# Patient Record
Sex: FEMALE | Race: WHITE | NOT HISPANIC OR LATINO | Employment: UNEMPLOYED | URBAN - NONMETROPOLITAN AREA
[De-identification: names, ages, dates, MRNs, and addresses within clinical notes are randomized per-mention and may not be internally consistent; named-entity substitution may affect disease eponyms.]

---

## 2017-02-23 ENCOUNTER — APPOINTMENT (OUTPATIENT)
Dept: GENERAL RADIOLOGY | Facility: HOSPITAL | Age: 37
End: 2017-02-23

## 2017-02-23 ENCOUNTER — HOSPITAL ENCOUNTER (EMERGENCY)
Facility: HOSPITAL | Age: 37
Discharge: HOME | End: 2017-02-24
Admitting: FAMILY MEDICINE

## 2017-02-23 DIAGNOSIS — S43.004A SHOULDER DISLOCATION, RIGHT, INITIAL ENCOUNTER: Primary | ICD-10-CM

## 2017-02-23 PROCEDURE — 99284 EMERGENCY DEPT VISIT MOD MDM: CPT

## 2017-02-23 PROCEDURE — 73090 X-RAY EXAM OF FOREARM: CPT

## 2017-02-23 PROCEDURE — 73030 X-RAY EXAM OF SHOULDER: CPT

## 2017-02-23 RX ORDER — LIDOCAINE HYDROCHLORIDE 10 MG/ML
INJECTION, SOLUTION INFILTRATION; PERINEURAL
Status: COMPLETED
Start: 2017-02-23 | End: 2017-02-23

## 2017-02-23 RX ORDER — PROPOFOL 10 MG/ML
50 VIAL (ML) INTRAVENOUS ONCE
Status: DISCONTINUED | OUTPATIENT
Start: 2017-02-23 | End: 2017-02-23

## 2017-02-23 RX ORDER — LISINOPRIL 30 MG/1
30 TABLET ORAL DAILY
COMMUNITY
End: 2021-01-10 | Stop reason: HOSPADM

## 2017-02-23 RX ADMIN — LIDOCAINE HYDROCHLORIDE: 10 INJECTION, SOLUTION INFILTRATION; PERINEURAL at 23:58

## 2017-02-24 ENCOUNTER — APPOINTMENT (OUTPATIENT)
Dept: GENERAL RADIOLOGY | Facility: HOSPITAL | Age: 37
End: 2017-02-24

## 2017-02-24 VITALS
TEMPERATURE: 97.2 F | RESPIRATION RATE: 19 BRPM | SYSTOLIC BLOOD PRESSURE: 115 MMHG | HEIGHT: 66 IN | HEART RATE: 70 BPM | OXYGEN SATURATION: 100 % | DIASTOLIC BLOOD PRESSURE: 69 MMHG | BODY MASS INDEX: 35.52 KG/M2 | WEIGHT: 221 LBS

## 2017-02-24 PROCEDURE — 73030 X-RAY EXAM OF SHOULDER: CPT

## 2017-02-24 RX ORDER — OXYCODONE AND ACETAMINOPHEN 7.5; 325 MG/1; MG/1
1 TABLET ORAL EVERY 4 HOURS PRN
Qty: 12 TABLET | Refills: 0 | Status: SHIPPED | OUTPATIENT
Start: 2017-02-24 | End: 2019-12-15 | Stop reason: HOSPADM

## 2017-03-30 ENCOUNTER — HOSPITAL ENCOUNTER (EMERGENCY)
Facility: HOSPITAL | Age: 37
Discharge: HOME | End: 2017-03-30
Admitting: NURSE PRACTITIONER

## 2017-03-30 ENCOUNTER — APPOINTMENT (OUTPATIENT)
Dept: GENERAL RADIOLOGY | Facility: HOSPITAL | Age: 37
End: 2017-03-30

## 2017-03-30 VITALS
RESPIRATION RATE: 16 BRPM | OXYGEN SATURATION: 97 % | DIASTOLIC BLOOD PRESSURE: 67 MMHG | HEIGHT: 66 IN | SYSTOLIC BLOOD PRESSURE: 101 MMHG | TEMPERATURE: 97.7 F | BODY MASS INDEX: 33.75 KG/M2 | WEIGHT: 210 LBS | HEART RATE: 65 BPM

## 2017-03-30 DIAGNOSIS — M79.671 PAIN, FOOT, CHRONIC, RIGHT: Primary | ICD-10-CM

## 2017-03-30 DIAGNOSIS — G89.29 PAIN, FOOT, CHRONIC, RIGHT: Primary | ICD-10-CM

## 2017-03-30 PROCEDURE — 99282 EMERGENCY DEPT VISIT SF MDM: CPT

## 2017-03-30 PROCEDURE — 73630 X-RAY EXAM OF FOOT: CPT

## 2017-03-30 RX ORDER — KETOROLAC TROMETHAMINE 10 MG/1
10 TABLET, FILM COATED ORAL EVERY 6 HOURS PRN
Qty: 15 TABLET | Refills: 0 | Status: SHIPPED | OUTPATIENT
Start: 2017-03-30 | End: 2019-12-15 | Stop reason: HOSPADM

## 2017-04-03 NOTE — ED PROVIDER NOTES
Subjective      Patient is a 36-year-old female presents emergency Department with complaints of right foot pain.  She reports chronic foot pain.  She denies any known injury.  She has had this pain for many months now again without any known injury.  She also presents with right shoulder pain.  She was evaluated in this emergency department on 02/23/2017 for dislocated shoulder.  She states that she was instructed to follow-up with orthopedics however was unable to do so because of lack of a referral from her primary care physician/an insurance issues.  She denies any new injury.  But wanted to let this provider know that she continues to have right shoulder pain.  Due to symptoms she elected to come the emergency department for evaluation treatment.  Patient is a 36 y.o. female presenting with lower extremity pain.   Lower Extremity Issue   Location:  Foot  Injury: no    Foot location:  R foot  Pain details:     Quality:  Throbbing    Severity:  Mild    Onset quality:  Gradual    Timing:  Intermittent    Progression:  Worsening  Chronicity:  Chronic  Prior injury to area:  No  Relieved by:  Nothing  Worsened by:  Bearing weight  Ineffective treatments:  None tried  Associated symptoms: no back pain, no decreased ROM, no fatigue, no fever, no muscle weakness, no neck pain, no numbness, no stiffness, no swelling and no tingling    Risk factors: no concern for non-accidental trauma        Review of Systems   Constitutional: Negative for appetite change, chills, fatigue and fever.   HENT: Negative for congestion and sore throat.    Respiratory: Negative for chest tightness and shortness of breath.    Cardiovascular: Negative for palpitations.   Gastrointestinal: Negative for abdominal distention, abdominal pain and vomiting.   Genitourinary: Negative for difficulty urinating and dysuria.   Musculoskeletal: Negative for back pain, joint swelling, neck pain, neck stiffness and stiffness.   Skin: Negative for rash.    Neurological: Negative for dizziness and headaches.   All other systems reviewed and are negative.      Past Medical History:   Diagnosis Date   • Hypertension    • TMJ (temporomandibular joint syndrome)        No Known Allergies    Past Surgical History:   Procedure Laterality Date   • HYSTERECTOMY         History reviewed. No pertinent family history.    Social History     Social History   • Marital status:      Spouse name: N/A   • Number of children: N/A   • Years of education: N/A     Social History Main Topics   • Smoking status: Current Some Day Smoker     Packs/day: 0.50   • Smokeless tobacco: None   • Alcohol use No   • Drug use: No   • Sexual activity: Not Asked     Other Topics Concern   • None     Social History Narrative       Prior to Admission medications    Medication Sig Start Date End Date Taking? Authorizing Provider   lisinopril (PRINIVIL,ZESTRIL) 30 MG tablet Take 30 mg by mouth Daily.   Yes Historical Provider, MD   acetaminophen-codeine (TYLENOL #3) 300-30 MG per tablet Take 1 tablet by mouth Every 4 (Four) Hours As Needed for moderate pain (4-6). 11/24/16   Mark Silver MD   acetaminophen-codeine (TYLENOL #3) 300-30 MG per tablet Take 1 tablet by mouth Every 4 (Four) Hours As Needed for moderate pain (4-6). 12/22/16   Patience Vicente,    amoxicillin-clavulanate (AUGMENTIN) 875-125 MG per tablet Take 1 tablet by mouth Every 12 (Twelve) Hours. 11/20/16   Mark Silver MD   ketorolac (TORADOL) 10 MG tablet Take 1 tablet by mouth Every 6 (Six) Hours As Needed for moderate pain (4-6). 11/20/16   Mark Silver MD   ketorolac (TORADOL) 10 MG tablet Take 1 tablet by mouth Every 6 (Six) Hours As Needed for Moderate Pain (4-6). 3/30/17   SHIVAM Shelby   neomycin-bacitracin-polymyxin (NEOSPORIN) 5-400-5000 ointment Apply  topically 3 (Three) Times a Day. 12/22/16   Patience Vicente DO   oxyCODONE-acetaminophen (PERCOCET) 7.5-325 MG per tablet Take 1 tablet by mouth Every 4  "(Four) Hours As Needed for moderate pain (4-6). 2/24/17   SHIVAM Verdin       Medications - No data to display    /67  Pulse 65  Temp 97.7 °F (36.5 °C)  Resp 16  Ht 66\" (167.6 cm)  Wt 210 lb (95.3 kg)  SpO2 97%  BMI 33.89 kg/m2      Objective   Physical Exam   Constitutional: She is oriented to person, place, and time. She appears well-developed and well-nourished.   HENT:   Head: Normocephalic and atraumatic.   Right Ear: External ear normal.   Left Ear: External ear normal.   Nose: Nose normal.   Mouth/Throat: Oropharynx is clear and moist.   Eyes: Conjunctivae and EOM are normal. Pupils are equal, round, and reactive to light.   Neck: Normal range of motion. Neck supple.   Cardiovascular: Normal rate, regular rhythm, normal heart sounds and intact distal pulses.    Pulmonary/Chest: Effort normal and breath sounds normal.   Abdominal: Soft. Bowel sounds are normal.   Musculoskeletal: Normal range of motion. She exhibits tenderness. She exhibits no edema.   Soft tissue tenderness to the dorsum of the right foot without obvious deformity noted; cap refill within normal limits; neurovascular intact.   Neurological: She is alert and oriented to person, place, and time.   Skin: Skin is warm and dry.   Psychiatric: She has a normal mood and affect. Her behavior is normal. Judgment and thought content normal.   Nursing note and vitals reviewed.      Procedures         Lab Results (last 24 hours)     ** No results found for the last 24 hours. **          XR Foot 3+ View Right   Final Result   1. Unremarkable radiographs of the right foot.   This report was finalized on 03/30/2017 17:10 by Dr. David Chavez MD.            ED Course  ED Course    x-rays are unremarkable.  Explained to the patient that she would need to follow-up with orthopedics as previously instructed for her right shoulder pain.  At that time she can also address any concerning issues of her foot pain that is chronic in nature. "  We gave the patient the name of the provider on-call today with orthopedics.  Explained to both patient and her  they could also go to the orthopedic walking clinic for evaluation if need be.  They both express understanding.  Also discussed the importance of getting a primary care physician in the event that they do need this sort of referral.       MDM  Number of Diagnoses or Management Options  Pain, foot, chronic, right: new and requires workup     Amount and/or Complexity of Data Reviewed  Tests in the radiology section of CPT®: ordered and reviewed    Risk of Complications, Morbidity, and/or Mortality  Presenting problems: low  Diagnostic procedures: low  Management options: low    Patient Progress  Patient progress: improved      Final diagnoses:   Pain, foot, chronic, right          Catherine Fu, SHIVAM  04/02/17 1920

## 2017-06-14 ENCOUNTER — HOSPITAL ENCOUNTER (OUTPATIENT)
Dept: PREADMISSION TESTING | Age: 37
Discharge: HOME | End: 2017-06-14
Payer: MEDICAID

## 2017-06-14 VITALS — WEIGHT: 197 LBS | BODY MASS INDEX: 31.66 KG/M2 | HEIGHT: 66 IN

## 2017-06-14 LAB
ANION GAP SERPL CALCULATED.3IONS-SCNC: 15 MMOL/L (ref 7–19)
BASOPHILS ABSOLUTE: 0.1 K/UL (ref 0–0.2)
BASOPHILS RELATIVE PERCENT: 1.5 % (ref 0–1)
BUN BLDV-MCNC: 12 MG/DL (ref 6–20)
CALCIUM SERPL-MCNC: 9.4 MG/DL (ref 8.6–10)
CHLORIDE BLD-SCNC: 102 MMOL/L (ref 98–111)
CO2: 26 MMOL/L (ref 22–29)
CREAT SERPL-MCNC: 0.7 MG/DL (ref 0.5–0.9)
EOSINOPHILS ABSOLUTE: 0.1 K/UL (ref 0–0.6)
EOSINOPHILS RELATIVE PERCENT: 1.1 % (ref 0–5)
GFR NON-AFRICAN AMERICAN: >60
GLUCOSE BLD-MCNC: 94 MG/DL (ref 74–109)
HCT VFR BLD CALC: 42.5 % (ref 37–47)
HEMOGLOBIN: 13.5 G/DL (ref 12–16)
LYMPHOCYTES ABSOLUTE: 2.6 K/UL (ref 1.1–4.5)
LYMPHOCYTES RELATIVE PERCENT: 42.2 % (ref 20–40)
MCH RBC QN AUTO: 29.6 PG (ref 27–31)
MCHC RBC AUTO-ENTMCNC: 31.8 G/DL (ref 33–37)
MCV RBC AUTO: 93.2 FL (ref 81–99)
MONOCYTES ABSOLUTE: 0.5 K/UL (ref 0–0.9)
MONOCYTES RELATIVE PERCENT: 8 % (ref 0–10)
NEUTROPHILS ABSOLUTE: 2.9 K/UL (ref 1.5–7.5)
NEUTROPHILS RELATIVE PERCENT: 47 % (ref 50–65)
PDW BLD-RTO: 12.5 % (ref 11.5–14.5)
PLATELET # BLD: 344 K/UL (ref 130–400)
PMV BLD AUTO: 10.7 FL (ref 9.4–12.3)
POTASSIUM SERPL-SCNC: 4.5 MMOL/L (ref 3.5–5)
RBC # BLD: 4.56 M/UL (ref 4.2–5.4)
SODIUM BLD-SCNC: 143 MMOL/L (ref 136–145)
WBC # BLD: 6.2 K/UL (ref 4.8–10.8)

## 2017-06-14 PROCEDURE — 85025 COMPLETE CBC W/AUTO DIFF WBC: CPT

## 2017-06-14 PROCEDURE — 93005 ELECTROCARDIOGRAM TRACING: CPT

## 2017-06-14 PROCEDURE — 80048 BASIC METABOLIC PNL TOTAL CA: CPT

## 2017-06-14 RX ORDER — LISINOPRIL 30 MG/1
30 TABLET ORAL DAILY
COMMUNITY

## 2017-06-15 ENCOUNTER — HOSPITAL ENCOUNTER (EMERGENCY)
Facility: HOSPITAL | Age: 37
Discharge: HOME | End: 2017-06-15
Admitting: NURSE PRACTITIONER

## 2017-06-15 ENCOUNTER — APPOINTMENT (OUTPATIENT)
Dept: GENERAL RADIOLOGY | Facility: HOSPITAL | Age: 37
End: 2017-06-15

## 2017-06-15 VITALS
HEIGHT: 66 IN | WEIGHT: 210 LBS | TEMPERATURE: 98.2 F | HEART RATE: 75 BPM | OXYGEN SATURATION: 99 % | SYSTOLIC BLOOD PRESSURE: 121 MMHG | DIASTOLIC BLOOD PRESSURE: 81 MMHG | RESPIRATION RATE: 18 BRPM | BODY MASS INDEX: 33.75 KG/M2

## 2017-06-15 DIAGNOSIS — S90.32XA CONTUSION OF LEFT FOOT, INITIAL ENCOUNTER: Primary | ICD-10-CM

## 2017-06-15 LAB
EKG P AXIS: 26 DEGREES
EKG P-R INTERVAL: 136 MS
EKG Q-T INTERVAL: 374 MS
EKG QRS DURATION: 82 MS
EKG QTC CALCULATION (BAZETT): 391 MS
EKG T AXIS: 26 DEGREES

## 2017-06-15 PROCEDURE — 73630 X-RAY EXAM OF FOOT: CPT

## 2017-06-15 PROCEDURE — 99283 EMERGENCY DEPT VISIT LOW MDM: CPT

## 2017-06-15 RX ORDER — IBUPROFEN 600 MG/1
600 TABLET ORAL EVERY 8 HOURS PRN
Qty: 30 TABLET | Refills: 0 | Status: SHIPPED | OUTPATIENT
Start: 2017-06-15 | End: 2022-08-23

## 2017-06-15 RX ORDER — TRAMADOL HYDROCHLORIDE 50 MG/1
50 TABLET ORAL EVERY 6 HOURS PRN
Qty: 15 TABLET | Refills: 0 | Status: SHIPPED | OUTPATIENT
Start: 2017-06-15 | End: 2019-12-15 | Stop reason: HOSPADM

## 2017-06-15 NOTE — ED NOTES
C/o smacking left 5th toe against bed frame last pm around 200-2100. Note bruising to entire 5th toe.     Concha Hermosillo RN  06/15/17 9086

## 2017-06-15 NOTE — DISCHARGE INSTRUCTIONS
Return to ER if symptoms worsen   Elevate/ ice for 24 hours then moist heat compresses three times a day for 15-20 min intervals

## 2017-06-15 NOTE — ED PROVIDER NOTES
Subjective   HPI Comments: Patient is a 37-year-old white female presents with left foot injury.  She states she ran it into the edge of the bed last night at home.  She is having pain to the left fifth digit.    Patient is a 37 y.o. female presenting with lower extremity pain.   History provided by:  Patient   used: No    Lower Extremity Issue       Review of Systems   Constitutional: Negative.    HENT: Negative.    Eyes: Negative.    Respiratory: Negative.    Cardiovascular: Negative.    Gastrointestinal: Negative.    Endocrine: Negative.    Genitourinary: Negative.    Musculoskeletal:        She presents with left lateral foot pain.  She states she ran her foot into the side of bed last night at home.  She is having pain to the fifth toe.  She denies any other injury.   Skin: Negative.    Allergic/Immunologic: Negative.    Neurological: Negative.    Hematological: Negative.    Psychiatric/Behavioral: Negative.    All other systems reviewed and are negative.      Past Medical History:   Diagnosis Date   • Hypertension    • Shoulder dislocation    • TMJ (temporomandibular joint syndrome)        No Known Allergies    Past Surgical History:   Procedure Laterality Date   • HYSTERECTOMY         History reviewed. No pertinent family history.    Social History     Social History   • Marital status:      Spouse name: N/A   • Number of children: N/A   • Years of education: N/A     Social History Main Topics   • Smoking status: Current Some Day Smoker     Packs/day: 0.50   • Smokeless tobacco: None   • Alcohol use No   • Drug use: No   • Sexual activity: Not Asked     Other Topics Concern   • None     Social History Narrative           Prior to Admission medications    Medication Sig Start Date End Date Taking? Authorizing Provider   lisinopril (PRINIVIL,ZESTRIL) 30 MG tablet Take 30 mg by mouth Daily.   Yes Historical Provider, MD   acetaminophen-codeine (TYLENOL #3) 300-30 MG per tablet  "Take 1 tablet by mouth Every 4 (Four) Hours As Needed for moderate pain (4-6). 11/24/16   Mark Silver MD   acetaminophen-codeine (TYLENOL #3) 300-30 MG per tablet Take 1 tablet by mouth Every 4 (Four) Hours As Needed for moderate pain (4-6). 12/22/16   Patience Vicente DO   amoxicillin-clavulanate (AUGMENTIN) 875-125 MG per tablet Take 1 tablet by mouth Every 12 (Twelve) Hours. 11/20/16   Mark Silver MD   ketorolac (TORADOL) 10 MG tablet Take 1 tablet by mouth Every 6 (Six) Hours As Needed for moderate pain (4-6). 11/20/16   Mark Silver MD   ketorolac (TORADOL) 10 MG tablet Take 1 tablet by mouth Every 6 (Six) Hours As Needed for Moderate Pain (4-6). 3/30/17   SHIVAM Shelby   neomycin-bacitracin-polymyxin (NEOSPORIN) 5-400-5000 ointment Apply  topically 3 (Three) Times a Day. 12/22/16   Patience Vicente DO   oxyCODONE-acetaminophen (PERCOCET) 7.5-325 MG per tablet Take 1 tablet by mouth Every 4 (Four) Hours As Needed for moderate pain (4-6). 2/24/17   SHIVAM Verdin       /81 (BP Location: Left arm, Patient Position: Sitting)  Pulse 75  Temp 98.2 °F (36.8 °C) (Temporal Artery )   Resp 18  Ht 66\" (167.6 cm)  Wt 210 lb (95.3 kg)  SpO2 99%  BMI 33.89 kg/m2    Objective   Physical Exam   Constitutional: She is oriented to person, place, and time. She appears well-developed and well-nourished.   HENT:   Head: Normocephalic and atraumatic.   Eyes: Conjunctivae and EOM are normal. Pupils are equal, round, and reactive to light.   Neck: Normal range of motion. Neck supple. No tracheal deviation present. No thyromegaly present.   Cardiovascular: Normal rate, regular rhythm, normal heart sounds and intact distal pulses.    Pulmonary/Chest: Effort normal and breath sounds normal. No respiratory distress. She has no wheezes. She has no rales. She exhibits no tenderness.   Abdominal: Soft. Bowel sounds are normal.   Musculoskeletal: Normal range of motion.   Moderate ecchymosis noted " to left 5th toe. Cap refill <2seconds. Mild soft tissue swelling noted. Pedal pulses palp   Neurological: She is alert and oriented to person, place, and time. She has normal reflexes. No cranial nerve deficit.   Skin: Skin is warm and dry.   Psychiatric: She has a normal mood and affect. Her behavior is normal. Judgment and thought content normal.   Nursing note and vitals reviewed.      Procedures         Lab Results (last 24 hours)     ** No results found for the last 24 hours. **          XR Foot 3+ View Left   Final Result   No acute osseous findings. Soft tissue swelling at the   lateral foot and anterior ankle.   This report was finalized on 06/15/2017 15:16 by Dr. Boaz Ledbetter MD.          ED Course  ED Course   Comment By Time   Ayden completed #[id number]. No signs of suspicious activity noted. Will write for small amt of pain medication for acute pain. Reviewed side effects and potential for abuse Jennifer Mirza, SHIVAM 06/15 1520          MDM  Number of Diagnoses or Management Options  Contusion of left foot, initial encounter: minor     Amount and/or Complexity of Data Reviewed  Tests in the radiology section of CPT®: ordered and reviewed    Patient Progress  Patient progress: stable      Final diagnoses:   Contusion of left foot, initial encounter          SHIVAM Verdin  06/15/17 2480

## 2017-06-25 PROBLEM — M25.311 INSTABILITY OF RIGHT SHOULDER JOINT: Status: ACTIVE | Noted: 2017-06-25

## 2017-06-26 ENCOUNTER — ANESTHESIA (OUTPATIENT)
Dept: OPERATING ROOM | Age: 37
End: 2017-06-26
Payer: MEDICAID

## 2017-06-26 ENCOUNTER — HOSPITAL ENCOUNTER (OUTPATIENT)
Age: 37
Setting detail: OUTPATIENT SURGERY
Discharge: HOME | End: 2017-06-26
Attending: ORTHOPAEDIC SURGERY | Admitting: ORTHOPAEDIC SURGERY
Payer: MEDICAID

## 2017-06-26 ENCOUNTER — ANESTHESIA EVENT (OUTPATIENT)
Dept: OPERATING ROOM | Age: 37
End: 2017-06-26
Payer: MEDICAID

## 2017-06-26 VITALS
TEMPERATURE: 97.9 F | HEART RATE: 64 BPM | SYSTOLIC BLOOD PRESSURE: 121 MMHG | DIASTOLIC BLOOD PRESSURE: 80 MMHG | WEIGHT: 200 LBS | BODY MASS INDEX: 32.14 KG/M2 | HEIGHT: 66 IN | RESPIRATION RATE: 18 BRPM | OXYGEN SATURATION: 96 %

## 2017-06-26 VITALS
DIASTOLIC BLOOD PRESSURE: 64 MMHG | OXYGEN SATURATION: 97 % | RESPIRATION RATE: 28 BRPM | TEMPERATURE: 97.3 F | SYSTOLIC BLOOD PRESSURE: 96 MMHG

## 2017-06-26 PROCEDURE — 2720000000 HC MISC SURG SUPPLY STERILE $0-50: Performed by: ORTHOPAEDIC SURGERY

## 2017-06-26 PROCEDURE — 2720000001 HC MISC SURG SUPPLY STERILE $51-500: Performed by: ORTHOPAEDIC SURGERY

## 2017-06-26 PROCEDURE — 2500000003 HC RX 250 WO HCPCS: Performed by: ORTHOPAEDIC SURGERY

## 2017-06-26 PROCEDURE — 6360000002 HC RX W HCPCS: Performed by: ANESTHESIOLOGY

## 2017-06-26 PROCEDURE — 6360000002 HC RX W HCPCS: Performed by: NURSE ANESTHETIST, CERTIFIED REGISTERED

## 2017-06-26 PROCEDURE — 7100000001 HC PACU RECOVERY - ADDTL 15 MIN: Performed by: ORTHOPAEDIC SURGERY

## 2017-06-26 PROCEDURE — 3600000004 HC SURGERY LEVEL 4 BASE: Performed by: ORTHOPAEDIC SURGERY

## 2017-06-26 PROCEDURE — 76942 ECHO GUIDE FOR BIOPSY: CPT | Performed by: NURSE ANESTHETIST, CERTIFIED REGISTERED

## 2017-06-26 PROCEDURE — 2500000003 HC RX 250 WO HCPCS: Performed by: NURSE ANESTHETIST, CERTIFIED REGISTERED

## 2017-06-26 PROCEDURE — 3700000000 HC ANESTHESIA ATTENDED CARE: Performed by: ORTHOPAEDIC SURGERY

## 2017-06-26 PROCEDURE — 7100000011 HC PHASE II RECOVERY - ADDTL 15 MIN: Performed by: ORTHOPAEDIC SURGERY

## 2017-06-26 PROCEDURE — 2580000003 HC RX 258: Performed by: ORTHOPAEDIC SURGERY

## 2017-06-26 PROCEDURE — 6360000002 HC RX W HCPCS: Performed by: ORTHOPAEDIC SURGERY

## 2017-06-26 PROCEDURE — 7100000000 HC PACU RECOVERY - FIRST 15 MIN: Performed by: ORTHOPAEDIC SURGERY

## 2017-06-26 PROCEDURE — 3700000001 HC ADD 15 MINUTES (ANESTHESIA): Performed by: ORTHOPAEDIC SURGERY

## 2017-06-26 PROCEDURE — 7100000010 HC PHASE II RECOVERY - FIRST 15 MIN: Performed by: ORTHOPAEDIC SURGERY

## 2017-06-26 PROCEDURE — 2720000003 HC MISC SUTURE/STAPLES/RELOADS/ETC: Performed by: ORTHOPAEDIC SURGERY

## 2017-06-26 PROCEDURE — 3600000014 HC SURGERY LEVEL 4 ADDTL 15MIN: Performed by: ORTHOPAEDIC SURGERY

## 2017-06-26 DEVICE — ANCHOR SUT DIA14MM 1 SFT SGL LD MB JUGGERKNOT: Type: IMPLANTABLE DEVICE | Site: SHOULDER | Status: FUNCTIONAL

## 2017-06-26 RX ORDER — MORPHINE SULFATE 4 MG/ML
4 INJECTION, SOLUTION INTRAMUSCULAR; INTRAVENOUS EVERY 5 MIN PRN
Status: DISCONTINUED | OUTPATIENT
Start: 2017-06-26 | End: 2017-06-26 | Stop reason: HOSPADM

## 2017-06-26 RX ORDER — MIDAZOLAM HYDROCHLORIDE 1 MG/ML
2 INJECTION INTRAMUSCULAR; INTRAVENOUS ONCE
Status: COMPLETED | OUTPATIENT
Start: 2017-06-26 | End: 2017-06-26

## 2017-06-26 RX ORDER — ONDANSETRON 2 MG/ML
INJECTION INTRAMUSCULAR; INTRAVENOUS PRN
Status: DISCONTINUED | OUTPATIENT
Start: 2017-06-26 | End: 2017-06-26 | Stop reason: SDUPTHER

## 2017-06-26 RX ORDER — ONDANSETRON 4 MG/1
4 TABLET, FILM COATED ORAL DAILY PRN
Qty: 20 TABLET | Refills: 0 | Status: SHIPPED | OUTPATIENT
Start: 2017-06-26

## 2017-06-26 RX ORDER — ENALAPRILAT 2.5 MG/2ML
1.25 INJECTION INTRAVENOUS
Status: DISCONTINUED | OUTPATIENT
Start: 2017-06-26 | End: 2017-06-26 | Stop reason: HOSPADM

## 2017-06-26 RX ORDER — MORPHINE SULFATE 4 MG/ML
2 INJECTION, SOLUTION INTRAMUSCULAR; INTRAVENOUS EVERY 5 MIN PRN
Status: DISCONTINUED | OUTPATIENT
Start: 2017-06-26 | End: 2017-06-26 | Stop reason: HOSPADM

## 2017-06-26 RX ORDER — HYDROCODONE BITARTRATE AND ACETAMINOPHEN 10; 325 MG/1; MG/1
TABLET ORAL
Qty: 40 TABLET | Refills: 0 | Status: SHIPPED | OUTPATIENT
Start: 2017-06-26

## 2017-06-26 RX ORDER — LABETALOL HYDROCHLORIDE 5 MG/ML
5 INJECTION, SOLUTION INTRAVENOUS EVERY 10 MIN PRN
Status: DISCONTINUED | OUTPATIENT
Start: 2017-06-26 | End: 2017-06-26 | Stop reason: HOSPADM

## 2017-06-26 RX ORDER — DIPHENHYDRAMINE HYDROCHLORIDE 50 MG/ML
12.5 INJECTION INTRAMUSCULAR; INTRAVENOUS
Status: DISCONTINUED | OUTPATIENT
Start: 2017-06-26 | End: 2017-06-26 | Stop reason: HOSPADM

## 2017-06-26 RX ORDER — HYDRALAZINE HYDROCHLORIDE 20 MG/ML
5 INJECTION INTRAMUSCULAR; INTRAVENOUS EVERY 10 MIN PRN
Status: DISCONTINUED | OUTPATIENT
Start: 2017-06-26 | End: 2017-06-26 | Stop reason: HOSPADM

## 2017-06-26 RX ORDER — TRAMADOL HYDROCHLORIDE 50 MG/1
50 TABLET ORAL EVERY 6 HOURS PRN
COMMUNITY

## 2017-06-26 RX ORDER — METOCLOPRAMIDE HYDROCHLORIDE 5 MG/ML
10 INJECTION INTRAMUSCULAR; INTRAVENOUS
Status: DISCONTINUED | OUTPATIENT
Start: 2017-06-26 | End: 2017-06-26 | Stop reason: HOSPADM

## 2017-06-26 RX ORDER — PROMETHAZINE HYDROCHLORIDE 25 MG/ML
6.25 INJECTION, SOLUTION INTRAMUSCULAR; INTRAVENOUS
Status: DISCONTINUED | OUTPATIENT
Start: 2017-06-26 | End: 2017-06-26 | Stop reason: HOSPADM

## 2017-06-26 RX ORDER — IBUPROFEN 600 MG/1
600 TABLET ORAL EVERY 8 HOURS PRN
COMMUNITY

## 2017-06-26 RX ORDER — DEXAMETHASONE SODIUM PHOSPHATE 10 MG/ML
INJECTION INTRAMUSCULAR; INTRAVENOUS PRN
Status: DISCONTINUED | OUTPATIENT
Start: 2017-06-26 | End: 2017-06-26 | Stop reason: SDUPTHER

## 2017-06-26 RX ORDER — SODIUM CHLORIDE, SODIUM LACTATE, POTASSIUM CHLORIDE, CALCIUM CHLORIDE 600; 310; 30; 20 MG/100ML; MG/100ML; MG/100ML; MG/100ML
INJECTION, SOLUTION INTRAVENOUS CONTINUOUS
Status: DISCONTINUED | OUTPATIENT
Start: 2017-06-26 | End: 2017-06-26 | Stop reason: HOSPADM

## 2017-06-26 RX ORDER — LIDOCAINE HYDROCHLORIDE 10 MG/ML
1 INJECTION, SOLUTION EPIDURAL; INFILTRATION; INTRACAUDAL; PERINEURAL ONCE
Status: COMPLETED | OUTPATIENT
Start: 2017-06-26 | End: 2017-06-26

## 2017-06-26 RX ORDER — TIZANIDINE 4 MG/1
4 TABLET ORAL EVERY 8 HOURS PRN
Qty: 40 TABLET | Refills: 0 | Status: SHIPPED | OUTPATIENT
Start: 2017-06-26

## 2017-06-26 RX ORDER — FENTANYL CITRATE 50 UG/ML
INJECTION, SOLUTION INTRAMUSCULAR; INTRAVENOUS PRN
Status: DISCONTINUED | OUTPATIENT
Start: 2017-06-26 | End: 2017-06-26 | Stop reason: SDUPTHER

## 2017-06-26 RX ORDER — LIDOCAINE HYDROCHLORIDE 10 MG/ML
INJECTION, SOLUTION EPIDURAL; INFILTRATION; INTRACAUDAL; PERINEURAL PRN
Status: DISCONTINUED | OUTPATIENT
Start: 2017-06-26 | End: 2017-06-26 | Stop reason: SDUPTHER

## 2017-06-26 RX ORDER — ROPIVACAINE HYDROCHLORIDE 5 MG/ML
INJECTION, SOLUTION EPIDURAL; INFILTRATION; PERINEURAL PRN
Status: DISCONTINUED | OUTPATIENT
Start: 2017-06-26 | End: 2017-06-26 | Stop reason: SDUPTHER

## 2017-06-26 RX ORDER — PROPOFOL 10 MG/ML
INJECTION, EMULSION INTRAVENOUS PRN
Status: DISCONTINUED | OUTPATIENT
Start: 2017-06-26 | End: 2017-06-26 | Stop reason: SDUPTHER

## 2017-06-26 RX ORDER — MEPERIDINE HYDROCHLORIDE 50 MG/ML
12.5 INJECTION INTRAMUSCULAR; INTRAVENOUS; SUBCUTANEOUS EVERY 5 MIN PRN
Status: DISCONTINUED | OUTPATIENT
Start: 2017-06-26 | End: 2017-06-26 | Stop reason: HOSPADM

## 2017-06-26 RX ORDER — ROCURONIUM BROMIDE 10 MG/ML
INJECTION, SOLUTION INTRAVENOUS PRN
Status: DISCONTINUED | OUTPATIENT
Start: 2017-06-26 | End: 2017-06-26 | Stop reason: SDUPTHER

## 2017-06-26 RX ADMIN — FENTANYL CITRATE 100 MCG: 50 INJECTION INTRAMUSCULAR; INTRAVENOUS at 09:38

## 2017-06-26 RX ADMIN — SUGAMMADEX 200 MG: 100 INJECTION, SOLUTION INTRAVENOUS at 10:57

## 2017-06-26 RX ADMIN — ONDANSETRON HYDROCHLORIDE 4 MG: 2 INJECTION, SOLUTION INTRAVENOUS at 09:54

## 2017-06-26 RX ADMIN — SODIUM CHLORIDE, POTASSIUM CHLORIDE, SODIUM LACTATE AND CALCIUM CHLORIDE: 600; 310; 30; 20 INJECTION, SOLUTION INTRAVENOUS at 07:14

## 2017-06-26 RX ADMIN — ROCURONIUM BROMIDE 50 MG: 10 INJECTION INTRAVENOUS at 09:39

## 2017-06-26 RX ADMIN — Medication 2 G: at 09:54

## 2017-06-26 RX ADMIN — LIDOCAINE HYDROCHLORIDE 5 ML: 10 INJECTION, SOLUTION EPIDURAL; INFILTRATION; INTRACAUDAL; PERINEURAL at 09:39

## 2017-06-26 RX ADMIN — LIDOCAINE HYDROCHLORIDE 1 ML: 10 INJECTION, SOLUTION EPIDURAL; INFILTRATION; INTRACAUDAL; PERINEURAL at 07:14

## 2017-06-26 RX ADMIN — ROPIVACAINE HYDROCHLORIDE 20 ML: 5 INJECTION, SOLUTION EPIDURAL; INFILTRATION; PERINEURAL at 08:25

## 2017-06-26 RX ADMIN — DEXAMETHASONE SODIUM PHOSPHATE 10 MG: 10 INJECTION INTRAMUSCULAR; INTRAVENOUS at 09:54

## 2017-06-26 RX ADMIN — PROPOFOL 200 MG: 10 INJECTION, EMULSION INTRAVENOUS at 09:39

## 2017-06-26 RX ADMIN — MIDAZOLAM 2 MG: 1 INJECTION INTRAMUSCULAR; INTRAVENOUS at 08:24

## 2017-06-26 ASSESSMENT — PAIN DESCRIPTION - ORIENTATION
ORIENTATION: RIGHT
ORIENTATION: RIGHT

## 2017-06-26 ASSESSMENT — PAIN DESCRIPTION - LOCATION
LOCATION: SHOULDER
LOCATION: SHOULDER

## 2017-06-26 ASSESSMENT — PAIN SCALES - GENERAL
PAINLEVEL_OUTOF10: 0
PAINLEVEL_OUTOF10: 3
PAINLEVEL_OUTOF10: 5

## 2017-06-26 ASSESSMENT — PAIN DESCRIPTION - PAIN TYPE
TYPE: SURGICAL PAIN
TYPE: SURGICAL PAIN

## 2017-06-26 ASSESSMENT — PAIN - FUNCTIONAL ASSESSMENT: PAIN_FUNCTIONAL_ASSESSMENT: 0-10

## 2018-05-07 ENCOUNTER — HOSPITAL ENCOUNTER (EMERGENCY)
Facility: HOSPITAL | Age: 38
Discharge: HOME | End: 2018-05-07
Attending: EMERGENCY MEDICINE | Admitting: EMERGENCY MEDICINE

## 2018-05-07 ENCOUNTER — APPOINTMENT (OUTPATIENT)
Dept: CT IMAGING | Facility: HOSPITAL | Age: 38
End: 2018-05-07

## 2018-05-07 VITALS
DIASTOLIC BLOOD PRESSURE: 90 MMHG | HEIGHT: 67 IN | RESPIRATION RATE: 16 BRPM | SYSTOLIC BLOOD PRESSURE: 153 MMHG | WEIGHT: 213.6 LBS | TEMPERATURE: 98.9 F | HEART RATE: 64 BPM | BODY MASS INDEX: 33.53 KG/M2 | OXYGEN SATURATION: 96 %

## 2018-05-07 DIAGNOSIS — B96.89 BACTERIAL VAGINOSIS: ICD-10-CM

## 2018-05-07 DIAGNOSIS — K52.9 COLITIS: Primary | ICD-10-CM

## 2018-05-07 DIAGNOSIS — N76.0 BACTERIAL VAGINOSIS: ICD-10-CM

## 2018-05-07 LAB
ALBUMIN SERPL-MCNC: 4.6 G/DL (ref 3.5–5)
ALBUMIN/GLOB SERPL: 1.4 G/DL (ref 1.1–2.5)
ALP SERPL-CCNC: 113 U/L (ref 24–120)
ALT SERPL W P-5'-P-CCNC: 24 U/L (ref 0–54)
ANION GAP SERPL CALCULATED.3IONS-SCNC: 12 MMOL/L (ref 4–13)
AST SERPL-CCNC: 31 U/L (ref 7–45)
B-HCG UR QL: NEGATIVE
BACTERIA UR QL AUTO: ABNORMAL /HPF
BASOPHILS # BLD AUTO: 0.08 10*3/MM3 (ref 0–0.2)
BASOPHILS NFR BLD AUTO: 0.8 % (ref 0–2)
BILIRUB SERPL-MCNC: 0.2 MG/DL (ref 0.1–1)
BILIRUB UR QL STRIP: NEGATIVE
BUN BLD-MCNC: 13 MG/DL (ref 5–21)
BUN/CREAT SERPL: 14.8 (ref 7–25)
CALCIUM SPEC-SCNC: 9.9 MG/DL (ref 8.4–10.4)
CHLORIDE SERPL-SCNC: 105 MMOL/L (ref 98–110)
CLARITY UR: CLEAR
CLUE CELLS SPEC QL WET PREP: ABNORMAL
CO2 SERPL-SCNC: 29 MMOL/L (ref 24–31)
COLOR UR: YELLOW
CREAT BLD-MCNC: 0.88 MG/DL (ref 0.5–1.4)
DEPRECATED RDW RBC AUTO: 39.4 FL (ref 40–54)
EOSINOPHIL # BLD AUTO: 0.03 10*3/MM3 (ref 0–0.7)
EOSINOPHIL NFR BLD AUTO: 0.3 % (ref 0–4)
ERYTHROCYTE [DISTWIDTH] IN BLOOD BY AUTOMATED COUNT: 12.1 % (ref 12–15)
GFR SERPL CREATININE-BSD FRML MDRD: 72 ML/MIN/1.73
GLOBULIN UR ELPH-MCNC: 3.4 GM/DL
GLUCOSE BLD-MCNC: 121 MG/DL (ref 70–100)
GLUCOSE UR STRIP-MCNC: NEGATIVE MG/DL
HCT VFR BLD AUTO: 42 % (ref 37–47)
HGB BLD-MCNC: 13.7 G/DL (ref 12–16)
HGB UR QL STRIP.AUTO: ABNORMAL
HOLD SPECIMEN: NORMAL
HOLD SPECIMEN: NORMAL
HYALINE CASTS UR QL AUTO: ABNORMAL /LPF
HYDATID CYST SPEC WET PREP: ABNORMAL
IMM GRANULOCYTES # BLD: 0.05 10*3/MM3 (ref 0–0.03)
IMM GRANULOCYTES NFR BLD: 0.5 % (ref 0–5)
KETONES UR QL STRIP: NEGATIVE
LEUKOCYTE ESTERASE UR QL STRIP.AUTO: NEGATIVE
LYMPHOCYTES # BLD AUTO: 2.67 10*3/MM3 (ref 0.72–4.86)
LYMPHOCYTES NFR BLD AUTO: 25.6 % (ref 15–45)
MCH RBC QN AUTO: 29.1 PG (ref 28–32)
MCHC RBC AUTO-ENTMCNC: 32.6 G/DL (ref 33–36)
MCV RBC AUTO: 89.2 FL (ref 82–98)
MONOCYTES # BLD AUTO: 0.72 10*3/MM3 (ref 0.19–1.3)
MONOCYTES NFR BLD AUTO: 6.9 % (ref 4–12)
NEUTROPHILS # BLD AUTO: 6.9 10*3/MM3 (ref 1.87–8.4)
NEUTROPHILS NFR BLD AUTO: 65.9 % (ref 39–78)
NITRITE UR QL STRIP: NEGATIVE
NRBC BLD MANUAL-RTO: 0 /100 WBC (ref 0–0)
PH UR STRIP.AUTO: <=5 [PH] (ref 5–8)
PLATELET # BLD AUTO: 353 10*3/MM3 (ref 130–400)
PMV BLD AUTO: 10.9 FL (ref 6–12)
POTASSIUM BLD-SCNC: 4.2 MMOL/L (ref 3.5–5.3)
PROT SERPL-MCNC: 8 G/DL (ref 6.3–8.7)
PROT UR QL STRIP: NEGATIVE
RBC # BLD AUTO: 4.71 10*6/MM3 (ref 4.2–5.4)
RBC # UR: ABNORMAL /HPF
REF LAB TEST METHOD: ABNORMAL
SODIUM BLD-SCNC: 146 MMOL/L (ref 135–145)
SP GR UR STRIP: 1.03 (ref 1–1.03)
SQUAMOUS #/AREA URNS HPF: ABNORMAL /HPF
T VAGINALIS SPEC QL WET PREP: ABNORMAL
UROBILINOGEN UR QL STRIP: ABNORMAL
WBC NRBC COR # BLD: 10.45 10*3/MM3 (ref 4.8–10.8)
WBC SPEC QL WET PREP: ABNORMAL
WBC UR QL AUTO: ABNORMAL /HPF
WHOLE BLOOD HOLD SPECIMEN: NORMAL
WHOLE BLOOD HOLD SPECIMEN: NORMAL
YEAST GENITAL QL WET PREP: ABNORMAL

## 2018-05-07 PROCEDURE — 96375 TX/PRO/DX INJ NEW DRUG ADDON: CPT

## 2018-05-07 PROCEDURE — 74176 CT ABD & PELVIS W/O CONTRAST: CPT

## 2018-05-07 PROCEDURE — 25010000002 KETOROLAC TROMETHAMINE PER 15 MG: Performed by: EMERGENCY MEDICINE

## 2018-05-07 PROCEDURE — 99284 EMERGENCY DEPT VISIT MOD MDM: CPT

## 2018-05-07 PROCEDURE — 87591 N.GONORRHOEAE DNA AMP PROB: CPT | Performed by: EMERGENCY MEDICINE

## 2018-05-07 PROCEDURE — 81025 URINE PREGNANCY TEST: CPT | Performed by: EMERGENCY MEDICINE

## 2018-05-07 PROCEDURE — 96374 THER/PROPH/DIAG INJ IV PUSH: CPT

## 2018-05-07 PROCEDURE — 81001 URINALYSIS AUTO W/SCOPE: CPT | Performed by: EMERGENCY MEDICINE

## 2018-05-07 PROCEDURE — 25010000002 CEFTRIAXONE PER 250 MG: Performed by: EMERGENCY MEDICINE

## 2018-05-07 PROCEDURE — 85025 COMPLETE CBC W/AUTO DIFF WBC: CPT | Performed by: EMERGENCY MEDICINE

## 2018-05-07 PROCEDURE — 87210 SMEAR WET MOUNT SALINE/INK: CPT | Performed by: EMERGENCY MEDICINE

## 2018-05-07 PROCEDURE — 87491 CHLMYD TRACH DNA AMP PROBE: CPT | Performed by: EMERGENCY MEDICINE

## 2018-05-07 PROCEDURE — 80053 COMPREHEN METABOLIC PANEL: CPT | Performed by: EMERGENCY MEDICINE

## 2018-05-07 RX ORDER — CIPROFLOXACIN 500 MG/1
500 TABLET, FILM COATED ORAL 2 TIMES DAILY
Qty: 14 TABLET | Refills: 0 | Status: SHIPPED | OUTPATIENT
Start: 2018-05-07 | End: 2019-12-15 | Stop reason: HOSPADM

## 2018-05-07 RX ORDER — DICYCLOMINE HCL 20 MG
20 TABLET ORAL EVERY 6 HOURS PRN
Qty: 20 TABLET | Refills: 0 | Status: SHIPPED | OUTPATIENT
Start: 2018-05-07

## 2018-05-07 RX ORDER — KETOROLAC TROMETHAMINE 30 MG/ML
30 INJECTION, SOLUTION INTRAMUSCULAR; INTRAVENOUS ONCE
Status: COMPLETED | OUTPATIENT
Start: 2018-05-07 | End: 2018-05-07

## 2018-05-07 RX ORDER — KETOROLAC TROMETHAMINE 30 MG/ML
60 INJECTION, SOLUTION INTRAMUSCULAR; INTRAVENOUS ONCE
Status: DISCONTINUED | OUTPATIENT
Start: 2018-05-07 | End: 2018-05-07

## 2018-05-07 RX ORDER — METRONIDAZOLE 500 MG/1
500 TABLET ORAL 2 TIMES DAILY
Qty: 14 TABLET | Refills: 0 | Status: SHIPPED | OUTPATIENT
Start: 2018-05-07 | End: 2019-12-15 | Stop reason: HOSPADM

## 2018-05-07 RX ORDER — NITROFURANTOIN 25; 75 MG/1; MG/1
100 CAPSULE ORAL ONCE
Status: DISCONTINUED | OUTPATIENT
Start: 2018-05-07 | End: 2018-05-07

## 2018-05-07 RX ADMIN — CEFTRIAXONE SODIUM 1 G: 1 INJECTION, POWDER, FOR SOLUTION INTRAMUSCULAR; INTRAVENOUS at 02:08

## 2018-05-07 RX ADMIN — KETOROLAC TROMETHAMINE 30 MG: 30 INJECTION, SOLUTION INTRAMUSCULAR at 02:08

## 2018-05-07 NOTE — DISCHARGE INSTRUCTIONS
Bacterial Vaginosis  Bacterial vaginosis is a vaginal infection that occurs when the normal balance of bacteria in the vagina is disrupted. It results from an overgrowth of certain bacteria. This is the most common vaginal infection among women ages 15-44.  Because bacterial vaginosis increases your risk for STIs (sexually transmitted infections), getting treated can help reduce your risk for chlamydia, gonorrhea, herpes, and HIV (human immunodeficiency virus). Treatment is also important for preventing complications in pregnant women, because this condition can cause an early (premature) delivery.  What are the causes?  This condition is caused by an increase in harmful bacteria that are normally present in small amounts in the vagina. However, the reason that the condition develops is not fully understood.  What increases the risk?  The following factors may make you more likely to develop this condition:  · Having a new sexual partner or multiple sexual partners.  · Having unprotected sex.  · Douching.  · Having an intrauterine device (IUD).  · Smoking.  · Drug and alcohol abuse.  · Taking certain antibiotic medicines.  · Being pregnant.  You cannot get bacterial vaginosis from toilet seats, bedding, swimming pools, or contact with objects around you.  What are the signs or symptoms?  Symptoms of this condition include:  · Grey or white vaginal discharge. The discharge can also be watery or foamy.  · A fish-like odor with discharge, especially after sexual intercourse or during menstruation.  · Itching in and around the vagina.  · Burning or pain with urination.  Some women with bacterial vaginosis have no signs or symptoms.  How is this diagnosed?  This condition is diagnosed based on:  · Your medical history.  · A physical exam of the vagina.  · Testing a sample of vaginal fluid under a microscope to look for a large amount of bad bacteria or abnormal cells. Your health care provider may use a cotton swab or a  small wooden spatula to collect the sample.  How is this treated?  This condition is treated with antibiotics. These may be given as a pill, a vaginal cream, or a medicine that is put into the vagina (suppository). If the condition comes back after treatment, a second round of antibiotics may be needed.  Follow these instructions at home:  Medicines   · Take over-the-counter and prescription medicines only as told by your health care provider.  · Take or use your antibiotic as told by your health care provider. Do not stop taking or using the antibiotic even if you start to feel better.  General instructions   · If you have a female sexual partner, tell her that you have a vaginal infection. She should see her health care provider and be treated if she has symptoms. If you have a male sexual partner, he does not need treatment.  · During treatment:  ¨ Avoid sexual activity until you finish treatment.  ¨ Do not douche.  ¨ Avoid alcohol as directed by your health care provider.  ¨ Avoid breastfeeding as directed by your health care provider.  · Drink enough water and fluids to keep your urine clear or pale yellow.  · Keep the area around your vagina and rectum clean.  ¨ Wash the area daily with warm water.  ¨ Wipe yourself from front to back after using the toilet.  · Keep all follow-up visits as told by your health care provider. This is important.  How is this prevented?  · Do not douche.  · Wash the outside of your vagina with warm water only.  · Use protection when having sex. This includes latex condoms and dental dams.  · Limit how many sexual partners you have. To help prevent bacterial vaginosis, it is best to have sex with just one partner (monogamous).  · Make sure you and your sexual partner are tested for STIs.  · Wear cotton or cotton-lined underwear.  · Avoid wearing tight pants and pantyhose, especially during summer.  · Limit the amount of alcohol that you drink.  · Do not use any products that contain  nicotine or tobacco, such as cigarettes and e-cigarettes. If you need help quitting, ask your health care provider.  · Do not use illegal drugs.  Where to find more information:  · Centers for Disease Control and Prevention: www.cdc.gov/std  · American Sexual Health Association (JOEL): www.ashastd.org  · U.S. Department of Health and Human Services, Office on Women's Health: www.womenshealth.gov/ or https://www.womenshealth.gov/a-z-topics/bacterial-vaginosis  Contact a health care provider if:  · Your symptoms do not improve, even after treatment.  · You have more discharge or pain when urinating.  · You have a fever.  · You have pain in your abdomen.  · You have pain during sex.  · You have vaginal bleeding between periods.  Summary  · Bacterial vaginosis is a vaginal infection that occurs when the normal balance of bacteria in the vagina is disrupted.  · Because bacterial vaginosis increases your risk for STIs (sexually transmitted infections), getting treated can help reduce your risk for chlamydia, gonorrhea, herpes, and HIV (human immunodeficiency virus). Treatment is also important for preventing complications in pregnant women, because the condition can cause an early (premature) delivery.  · This condition is treated with antibiotic medicines. These may be given as a pill, a vaginal cream, or a medicine that is put into the vagina (suppository).  This information is not intended to replace advice given to you by your health care provider. Make sure you discuss any questions you have with your health care provider.  Document Released: 12/18/2006 Document Revised: 09/02/2017 Document Reviewed: 09/02/2017  Elsevier Interactive Patient Education © 2017 [employer]

## 2018-05-07 NOTE — ED PROVIDER NOTES
Subjective   37 y/o female arrives for suprapubic abdominal pain, dysuria and possible hematuria. Possible as patient is unsure if blood is from urine or vagina. She notes above symptoms for three days. She denies fevers, chills, flank pain, falls, trauma, vaginal discharge, blood per rectum, diarrhea, vaginal discharge, nausea, vomiting, rash, abx usage or other issues. Patient is s/p hysterectomy. She arrives in [facility] EMS.             Review of Systems   All other systems reviewed and are negative.      Past Medical History:   Diagnosis Date   • Hypertension    • Shoulder dislocation    • TMJ (temporomandibular joint syndrome)        No Known Allergies    Past Surgical History:   Procedure Laterality Date   • HYSTERECTOMY         History reviewed. No pertinent family history.    Social History     Social History   • Marital status:      Social History Main Topics   • Smoking status: Current Some Day Smoker     Packs/day: 0.50   • Alcohol use No   • Drug use: No     Other Topics Concern   • Not on file     Social History Narrative               Objective   Physical Exam   Constitutional: She is oriented to person, place, and time. She appears well-developed and well-nourished.   HENT:   Head: Normocephalic.   Nose: Nose normal.   Eyes: EOM are normal. Pupils are equal, round, and reactive to light.   Neck: Normal range of motion. Neck supple.   Abdominal: Soft. Bowel sounds are normal. She exhibits no distension and no mass. There is tenderness. There is no rebound and no guarding. No hernia.   Suprapubic tenderness, no guarding, no rigidity.    Genitourinary: Vagina normal. Cervix exhibits no motion tenderness, no discharge and no friability. Right adnexum displays no mass, no tenderness and no fullness. Left adnexum displays no mass, no tenderness and no fullness. No erythema, tenderness or bleeding in the vagina. No foreign body in the vagina. No signs of injury around the vagina. No vaginal  discharge found.   Genitourinary Comments: No blood    Performed with Nurse Jasmine   Musculoskeletal: Normal range of motion.   Neurological: She is alert and oriented to person, place, and time.   Skin: Skin is warm. Capillary refill takes less than 2 seconds.   Psychiatric: She has a normal mood and affect. Her behavior is normal.   Vitals reviewed.      Procedures           ED Course  ED Course      CT Abdomen Pelvis Without Contrast    (Results Pending)     Labs Reviewed   WET PREP, GENITAL - Abnormal; Notable for the following:        Result Value    Clue Cells, Wet Prep 1+ Clue cells seen (*)     All other components within normal limits   COMPREHENSIVE METABOLIC PANEL - Abnormal; Notable for the following:     Glucose 121 (*)     Sodium 146 (*)     All other components within normal limits   CBC WITH AUTO DIFFERENTIAL - Abnormal; Notable for the following:     MCHC 32.6 (*)     RDW-SD 39.4 (*)     Immature Grans, Absolute 0.05 (*)     All other components within normal limits   URINALYSIS W/ MICROSCOPIC IF INDICATED - Abnormal; Notable for the following:     Blood, UA Trace (*)     All other components within normal limits   URINALYSIS, MICROSCOPIC ONLY - Abnormal; Notable for the following:     RBC, UA 0-2 (*)     WBC, UA 0-2 (*)     Squamous Epithelial Cells, UA 3-6 (*)     All other components within normal limits   PREGNANCY, URINE - Normal   CHLAMYDIA TRACHOMATIS, NEISSERIA GONORRHOEAE, PCR   RAINBOW DRAW    Narrative:     The following orders were created for panel order [facility] Draw.  Procedure                               Abnormality         Status                     ---------                               -----------         ------                     Light Blue Top[[id number]]                                    Final result               Green Top (Gel)[[id number]]                                   Final result               Lavender Top[[id number]]                                      Final result                Red Top[[id number]]                                           Final result                 Please view results for these tests on the individual orders.   CBC AND DIFFERENTIAL    Narrative:     The following orders were created for panel order CBC & Differential.  Procedure                               Abnormality         Status                     ---------                               -----------         ------                     CBC Auto Differential[[id number]]         Abnormal            Final result                 Please view results for these tests on the individual orders.   LIGHT BLUE TOP   GREEN TOP   LAVENDER TOP   RED TOP         Ct: read as mild wall thiekcening of descending left colon.     At this time, no other acute findings, abdomen soft, will dc to home.             MDM      Final diagnoses:   Colitis   Bacterial vaginosis            Wale Sanchez MD  05/07/18 0314

## 2018-05-09 LAB
C TRACH RRNA SPEC DONR QL NAA+PROBE: NEGATIVE
N GONORRHOEA DNA SPEC QL NAA+PROBE: NEGATIVE

## 2019-01-19 NOTE — H&P (VIEW-ONLY)
"Chief Complaint   Patient presents with   • GI Problem     having lots of acid reflux       PCP: Estevan Izaguirre APRN  REFER: RickyCody, DO    Subjective     HPI    Hx of GERD x 5 years.  Over past 6 months heartburn has worsened.  She does not take Protonix daily.   Salsa causes increase in heartburn.  Sometimes consumption of water will cause heartburn.  She consumes \"quite a bit\" of caffeine daily.  After eating lasagna she felt her esophagus was \"on fire.\"  No BRBPR or melena.  No abdominal pain.  Denies regular use of NSAiD.    No previous colonoscopy or endoscopy evaluation    Past Medical History:   Diagnosis Date   • Hypertension    • Shoulder dislocation    • TMJ (temporomandibular joint syndrome)        Past Surgical History:   Procedure Laterality Date   • HYSTERECTOMY         Outpatient Medications Marked as Taking for the 1/21/19 encounter (Office Visit) with Tera Plata APRN   Medication Sig Dispense Refill   • pantoprazole (PROTONIX) 40 MG EC tablet Take 40 mg by mouth Daily.     • sucralfate (CARAFATE) 1 g tablet Take 1 g by mouth 4 (Four) Times a Day.         No Known Allergies    Social History     Socioeconomic History   • Marital status:      Spouse name: Not on file   • Number of children: Not on file   • Years of education: Not on file   • Highest education level: Not on file   Social Needs   • Financial resource strain: Not on file   • Food insecurity - worry: Not on file   • Food insecurity - inability: Not on file   • Transportation needs - medical: Not on file   • Transportation needs - non-medical: Not on file   Occupational History   • Not on file   Tobacco Use   • Smoking status: Current Some Day Smoker     Packs/day: 0.00     Years: 5.00     Pack years: 0.00   • Smokeless tobacco: Never Used   Substance and Sexual Activity   • Alcohol use: No   • Drug use: No   • Sexual activity: Not on file   Other Topics Concern   • Not on file   Social History Narrative   " "          Family History   Problem Relation Age of Onset   • Colon cancer Neg Hx    • Colon polyps Neg Hx    • Esophageal cancer Neg Hx        Review of Systems   Constitutional: Negative for fatigue, fever and unexpected weight change.   HENT: Negative for hearing loss, sore throat and voice change.    Eyes: Negative for visual disturbance.   Respiratory: Negative for cough, shortness of breath and wheezing.    Cardiovascular: Negative for chest pain and palpitations.   Gastrointestinal: Negative for abdominal pain, blood in stool and vomiting.   Endocrine: Negative for polydipsia and polyuria.   Genitourinary: Negative for difficulty urinating, dysuria, hematuria and urgency.   Musculoskeletal: Negative for joint swelling and myalgias.   Skin: Negative for color change, rash and wound.   Neurological: Negative for dizziness, tremors, seizures and syncope.   Hematological: Does not bruise/bleed easily.   Psychiatric/Behavioral: Negative for agitation and confusion. The patient is not nervous/anxious.        Objective     Vitals:    01/21/19 0820   BP: 130/80   Pulse: 66   Temp: 97 °F (36.1 °C)   SpO2: 98%   Weight: 94.3 kg (208 lb)   Height: 167.6 cm (66\")     Body mass index is 33.57 kg/m².    Physical Exam   Constitutional: She is oriented to person, place, and time. She appears well-developed and well-nourished. She is cooperative.   HENT:   Head: Normocephalic and atraumatic.   Eyes: Conjunctivae are normal. Pupils are equal, round, and reactive to light. No scleral icterus.   Neck: Normal range of motion. Neck supple. No JVD present. No thyroid mass and no thyromegaly present.   Cardiovascular: Normal rate, regular rhythm and normal heart sounds. Exam reveals no gallop and no friction rub.   No murmur heard.  Pulmonary/Chest: Effort normal and breath sounds normal. No accessory muscle usage. No respiratory distress. She has no wheezes. She has no rales.   Abdominal: Soft. Normal appearance and bowel " sounds are normal. She exhibits no distension, no ascites and no mass. There is no hepatosplenomegaly. There is no tenderness. There is no rebound and no guarding.   Musculoskeletal: Normal range of motion. She exhibits no edema or tenderness.     Vascular Status -  Her right foot exhibits normal foot vasculature  and no edema. Her left foot exhibits normal foot vasculature  and no edema.  Lymphadenopathy:     She has no cervical adenopathy.   Neurological: She is alert and oriented to person, place, and time. She has normal strength. Gait normal.   Skin: Skin is warm, dry and intact. No rash noted.       Imaging Results (most recent)     None          Body mass index is 33.57 kg/m².    Assessment/Plan     Betty was seen today for gi problem.    Diagnoses and all orders for this visit:    Gastroesophageal reflux disease, esophagitis presence not specified  -     Case Request; Standing  -     Implement Anesthesia Orders Day of Procedure; Standing  -     Obtain Informed Consent; Standing  -     Case Request        ESOPHAGOGASTRODUODENOSCOPY WITH ANESTHESIA (N/A)     Take protonix daily, 30 min prior to breakfast  Discussion regarding evaluation for Cuellar's esophagus and this being a precancer condition    Decrease caffeine and nicotine, don't lie down within 2-3 hours of eating, elevate HOB 4-6 inches on blocks    Patient is to continue all blood pressure and cardiac medications prior to procedure and has been advised to take medications morning of procedure   Pt verbalized understanding    Advised pt to stop ASA, use of NSAIDs, Fish Oil, and MV 5 days prior to procedure, per Dr Ramirez protocol.  Tylenol based products are ok to take.  Pt verbalized understanding.      The risk of the endoscopy were discussed in detail.  We discussed the risk of perforation (one out of 0492-8540, riskier with dilation), bleeding (one out of 500), and the rare risks of infection, adverse reaction to anesthesia, respiratory failure,  cardiac failure including MI and adverse reaction to medications, etc.  We discussed consequences that could occur if a risk were to develop such as the need for hospitalization, blood transfusion, surgical intervention, medications, pain and disability and death.  Alternatives include not doing anything, or pursuing an UGI series which only offers a diagnosis with potential less accuracy compared to egd.  The patient verbalizes understanding and agrees to proceed.      I advised Betty of the risks of continuing to use tobacco, and I provided her with tobacco cessation educational materials in the After Visit Summary.     During this visit, I spent 3 minutes counseling the patient regarding tobacco cessation.    Patient's Body mass index is 33.57 kg/m². BMI is above normal parameters. Recommendations include: no follow-up required.      Patient Instructions   Gastroesophageal Reflux Disease, Adult    Gastroesophageal reflux disease (GERD) happens when acid from your stomach flows up into the esophagus. When acid comes in contact with the esophagus, the acid causes soreness (inflammation) in the esophagus. Over time, GERD may create small holes (ulcers) in the lining of the esophagus.  CAUSES    · Increased body weight. This puts pressure on the stomach, making acid rise from the stomach into the esophagus.  · Smoking. This increases acid production in the stomach.  · Drinking alcohol. This causes decreased pressure in the lower esophageal sphincter (valve or ring of muscle between the esophagus and stomach), allowing acid from the stomach into the esophagus.  · Late evening meals and a full stomach. This increases pressure and acid production in the stomach.  · A malformed lower esophageal sphincter.  Sometimes, no cause is found.  SYMPTOMS    · Burning pain in the lower part of the mid-chest behind the breastbone and in the mid-stomach area. This may occur twice a week or more often.  · Trouble  swallowing.  · Sore throat.  · Dry cough.  · Asthma-like symptoms including chest tightness, shortness of breath, or wheezing.  DIAGNOSIS    Your caregiver may be able to diagnose GERD based on your symptoms. In some cases, X-rays and other tests may be done to check for complications or to check the condition of your stomach and esophagus.  TREATMENT    Your caregiver may recommend over-the-counter or prescription medicines to help decrease acid production. Ask your caregiver before starting or adding any new medicines.    HOME CARE INSTRUCTIONS    · Change the factors that you can control. Ask your caregiver for guidance concerning weight loss, quitting smoking, and alcohol consumption.  · Avoid foods and drinks that make your symptoms worse, such as:  ¨ Caffeine or alcoholic drinks.  ¨ Chocolate.  ¨ Peppermint or mint flavorings.  ¨ Garlic and onions.  ¨ Spicy foods.  ¨ Citrus fruits, such as oranges, rell, or limes.  ¨ Tomato-based foods such as sauce, chili, salsa, and pizza.  ¨ Fried and fatty foods.  · Avoid lying down for the 3 hours prior to your bedtime or prior to taking a nap.  · Eat small, frequent meals instead of large meals.  · Wear loose-fitting clothing. Do not wear anything tight around your waist that causes pressure on your stomach.  · Raise the head of your bed 6 to 8 inches with wood blocks to help you sleep. Extra pillows will not help.  · Only take over-the-counter or prescription medicines for pain, discomfort, or fever as directed by your caregiver.  · Do not take aspirin, ibuprofen, or other nonsteroidal anti-inflammatory drugs (NSAIDs).  SEEK IMMEDIATE MEDICAL CARE IF:    · You have pain in your arms, neck, jaw, teeth, or back.  · Your pain increases or changes in intensity or duration.  · You develop nausea, vomiting, or sweating (diaphoresis).  · You develop shortness of breath, or you faint.  · Your vomit is green, yellow, black, or looks like coffee grounds or blood.  · Your stool  is red, bloody, or black.  These symptoms could be signs of other problems, such as heart disease, gastric bleeding, or esophageal bleeding.  MAKE SURE YOU:    · Understand these instructions.  · Will watch your condition.  · Will get help right away if you are not doing well or get worse.     This information is not intended to replace advice given to you by your health care provider. Make sure you discuss any questions you have with your health care provider.     Document Released: 09/27/2006 Document Revised: 01/08/2016 Document Reviewed: 04/13/2016  [employer] Interactive Patient Education ©2016 [employer]

## 2019-01-19 NOTE — PROGRESS NOTES
"Chief Complaint   Patient presents with   • GI Problem     having lots of acid reflux       PCP: Estevan Izaguirre APRN  REFER: RickyCody, DO    Subjective     HPI    Hx of GERD x 5 years.  Over past 6 months heartburn has worsened.  She does not take Protonix daily.   Salsa causes increase in heartburn.  Sometimes consumption of water will cause heartburn.  She consumes \"quite a bit\" of caffeine daily.  After eating lasagna she felt her esophagus was \"on fire.\"  No BRBPR or melena.  No abdominal pain.  Denies regular use of NSAiD.    No previous colonoscopy or endoscopy evaluation    Past Medical History:   Diagnosis Date   • Hypertension    • Shoulder dislocation    • TMJ (temporomandibular joint syndrome)        Past Surgical History:   Procedure Laterality Date   • HYSTERECTOMY         Outpatient Medications Marked as Taking for the 1/21/19 encounter (Office Visit) with Tera Plata APRN   Medication Sig Dispense Refill   • pantoprazole (PROTONIX) 40 MG EC tablet Take 40 mg by mouth Daily.     • sucralfate (CARAFATE) 1 g tablet Take 1 g by mouth 4 (Four) Times a Day.         No Known Allergies    Social History     Socioeconomic History   • Marital status:      Spouse name: Not on file   • Number of children: Not on file   • Years of education: Not on file   • Highest education level: Not on file   Social Needs   • Financial resource strain: Not on file   • Food insecurity - worry: Not on file   • Food insecurity - inability: Not on file   • Transportation needs - medical: Not on file   • Transportation needs - non-medical: Not on file   Occupational History   • Not on file   Tobacco Use   • Smoking status: Current Some Day Smoker     Packs/day: 0.00     Years: 5.00     Pack years: 0.00   • Smokeless tobacco: Never Used   Substance and Sexual Activity   • Alcohol use: No   • Drug use: No   • Sexual activity: Not on file   Other Topics Concern   • Not on file   Social History Narrative " "          Family History   Problem Relation Age of Onset   • Colon cancer Neg Hx    • Colon polyps Neg Hx    • Esophageal cancer Neg Hx        Review of Systems   Constitutional: Negative for fatigue, fever and unexpected weight change.   HENT: Negative for hearing loss, sore throat and voice change.    Eyes: Negative for visual disturbance.   Respiratory: Negative for cough, shortness of breath and wheezing.    Cardiovascular: Negative for chest pain and palpitations.   Gastrointestinal: Negative for abdominal pain, blood in stool and vomiting.   Endocrine: Negative for polydipsia and polyuria.   Genitourinary: Negative for difficulty urinating, dysuria, hematuria and urgency.   Musculoskeletal: Negative for joint swelling and myalgias.   Skin: Negative for color change, rash and wound.   Neurological: Negative for dizziness, tremors, seizures and syncope.   Hematological: Does not bruise/bleed easily.   Psychiatric/Behavioral: Negative for agitation and confusion. The patient is not nervous/anxious.        Objective     Vitals:    01/21/19 0820   BP: 130/80   Pulse: 66   Temp: 97 °F (36.1 °C)   SpO2: 98%   Weight: 94.3 kg (208 lb)   Height: 167.6 cm (66\")     Body mass index is 33.57 kg/m².    Physical Exam   Constitutional: She is oriented to person, place, and time. She appears well-developed and well-nourished. She is cooperative.   HENT:   Head: Normocephalic and atraumatic.   Eyes: Conjunctivae are normal. Pupils are equal, round, and reactive to light. No scleral icterus.   Neck: Normal range of motion. Neck supple. No JVD present. No thyroid mass and no thyromegaly present.   Cardiovascular: Normal rate, regular rhythm and normal heart sounds. Exam reveals no gallop and no friction rub.   No murmur heard.  Pulmonary/Chest: Effort normal and breath sounds normal. No accessory muscle usage. No respiratory distress. She has no wheezes. She has no rales.   Abdominal: Soft. Normal appearance and bowel " sounds are normal. She exhibits no distension, no ascites and no mass. There is no hepatosplenomegaly. There is no tenderness. There is no rebound and no guarding.   Musculoskeletal: Normal range of motion. She exhibits no edema or tenderness.     Vascular Status -  Her right foot exhibits normal foot vasculature  and no edema. Her left foot exhibits normal foot vasculature  and no edema.  Lymphadenopathy:     She has no cervical adenopathy.   Neurological: She is alert and oriented to person, place, and time. She has normal strength. Gait normal.   Skin: Skin is warm, dry and intact. No rash noted.       Imaging Results (most recent)     None          Body mass index is 33.57 kg/m².    Assessment/Plan     Betty was seen today for gi problem.    Diagnoses and all orders for this visit:    Gastroesophageal reflux disease, esophagitis presence not specified  -     Case Request; Standing  -     Implement Anesthesia Orders Day of Procedure; Standing  -     Obtain Informed Consent; Standing  -     Case Request        ESOPHAGOGASTRODUODENOSCOPY WITH ANESTHESIA (N/A)     Take protonix daily, 30 min prior to breakfast  Discussion regarding evaluation for Cuellar's esophagus and this being a precancer condition    Decrease caffeine and nicotine, don't lie down within 2-3 hours of eating, elevate HOB 4-6 inches on blocks    Patient is to continue all blood pressure and cardiac medications prior to procedure and has been advised to take medications morning of procedure   Pt verbalized understanding    Advised pt to stop ASA, use of NSAIDs, Fish Oil, and MV 5 days prior to procedure, per Dr Ramirez protocol.  Tylenol based products are ok to take.  Pt verbalized understanding.      The risk of the endoscopy were discussed in detail.  We discussed the risk of perforation (one out of 2424-9671, riskier with dilation), bleeding (one out of 500), and the rare risks of infection, adverse reaction to anesthesia, respiratory failure,  cardiac failure including MI and adverse reaction to medications, etc.  We discussed consequences that could occur if a risk were to develop such as the need for hospitalization, blood transfusion, surgical intervention, medications, pain and disability and death.  Alternatives include not doing anything, or pursuing an UGI series which only offers a diagnosis with potential less accuracy compared to egd.  The patient verbalizes understanding and agrees to proceed.      I advised Betty of the risks of continuing to use tobacco, and I provided her with tobacco cessation educational materials in the After Visit Summary.     During this visit, I spent 3 minutes counseling the patient regarding tobacco cessation.    Patient's Body mass index is 33.57 kg/m². BMI is above normal parameters. Recommendations include: no follow-up required.      Patient Instructions   Gastroesophageal Reflux Disease, Adult    Gastroesophageal reflux disease (GERD) happens when acid from your stomach flows up into the esophagus. When acid comes in contact with the esophagus, the acid causes soreness (inflammation) in the esophagus. Over time, GERD may create small holes (ulcers) in the lining of the esophagus.  CAUSES    · Increased body weight. This puts pressure on the stomach, making acid rise from the stomach into the esophagus.  · Smoking. This increases acid production in the stomach.  · Drinking alcohol. This causes decreased pressure in the lower esophageal sphincter (valve or ring of muscle between the esophagus and stomach), allowing acid from the stomach into the esophagus.  · Late evening meals and a full stomach. This increases pressure and acid production in the stomach.  · A malformed lower esophageal sphincter.  Sometimes, no cause is found.  SYMPTOMS    · Burning pain in the lower part of the mid-chest behind the breastbone and in the mid-stomach area. This may occur twice a week or more often.  · Trouble  swallowing.  · Sore throat.  · Dry cough.  · Asthma-like symptoms including chest tightness, shortness of breath, or wheezing.  DIAGNOSIS    Your caregiver may be able to diagnose GERD based on your symptoms. In some cases, X-rays and other tests may be done to check for complications or to check the condition of your stomach and esophagus.  TREATMENT    Your caregiver may recommend over-the-counter or prescription medicines to help decrease acid production. Ask your caregiver before starting or adding any new medicines.    HOME CARE INSTRUCTIONS    · Change the factors that you can control. Ask your caregiver for guidance concerning weight loss, quitting smoking, and alcohol consumption.  · Avoid foods and drinks that make your symptoms worse, such as:  ¨ Caffeine or alcoholic drinks.  ¨ Chocolate.  ¨ Peppermint or mint flavorings.  ¨ Garlic and onions.  ¨ Spicy foods.  ¨ Citrus fruits, such as oranges, rell, or limes.  ¨ Tomato-based foods such as sauce, chili, salsa, and pizza.  ¨ Fried and fatty foods.  · Avoid lying down for the 3 hours prior to your bedtime or prior to taking a nap.  · Eat small, frequent meals instead of large meals.  · Wear loose-fitting clothing. Do not wear anything tight around your waist that causes pressure on your stomach.  · Raise the head of your bed 6 to 8 inches with wood blocks to help you sleep. Extra pillows will not help.  · Only take over-the-counter or prescription medicines for pain, discomfort, or fever as directed by your caregiver.  · Do not take aspirin, ibuprofen, or other nonsteroidal anti-inflammatory drugs (NSAIDs).  SEEK IMMEDIATE MEDICAL CARE IF:    · You have pain in your arms, neck, jaw, teeth, or back.  · Your pain increases or changes in intensity or duration.  · You develop nausea, vomiting, or sweating (diaphoresis).  · You develop shortness of breath, or you faint.  · Your vomit is green, yellow, black, or looks like coffee grounds or blood.  · Your stool  is red, bloody, or black.  These symptoms could be signs of other problems, such as heart disease, gastric bleeding, or esophageal bleeding.  MAKE SURE YOU:    · Understand these instructions.  · Will watch your condition.  · Will get help right away if you are not doing well or get worse.     This information is not intended to replace advice given to you by your health care provider. Make sure you discuss any questions you have with your health care provider.     Document Released: 09/27/2006 Document Revised: 01/08/2016 Document Reviewed: 04/13/2016  [email or web address] Interactive Patient Education ©2016 [email or web address]

## 2019-01-21 ENCOUNTER — OFFICE VISIT (OUTPATIENT)
Dept: GASTROENTEROLOGY | Facility: CLINIC | Age: 39
End: 2019-01-21

## 2019-01-21 VITALS
SYSTOLIC BLOOD PRESSURE: 130 MMHG | HEIGHT: 66 IN | TEMPERATURE: 97 F | OXYGEN SATURATION: 98 % | BODY MASS INDEX: 33.43 KG/M2 | WEIGHT: 208 LBS | DIASTOLIC BLOOD PRESSURE: 80 MMHG | HEART RATE: 66 BPM

## 2019-01-21 DIAGNOSIS — K21.9 GASTROESOPHAGEAL REFLUX DISEASE, ESOPHAGITIS PRESENCE NOT SPECIFIED: Primary | ICD-10-CM

## 2019-01-21 PROCEDURE — 99204 OFFICE O/P NEW MOD 45 MIN: CPT | Performed by: NURSE PRACTITIONER

## 2019-01-21 RX ORDER — SUCRALFATE 1 G/1
1 TABLET ORAL 4 TIMES DAILY
COMMUNITY

## 2019-01-21 RX ORDER — PANTOPRAZOLE SODIUM 40 MG/1
40 TABLET, DELAYED RELEASE ORAL DAILY
COMMUNITY

## 2019-01-21 NOTE — PATIENT INSTRUCTIONS
Gastroesophageal Reflux Disease, Adult    Gastroesophageal reflux disease (GERD) happens when acid from your stomach flows up into the esophagus. When acid comes in contact with the esophagus, the acid causes soreness (inflammation) in the esophagus. Over time, GERD may create small holes (ulcers) in the lining of the esophagus.  CAUSES    · Increased body weight. This puts pressure on the stomach, making acid rise from the stomach into the esophagus.  · Smoking. This increases acid production in the stomach.  · Drinking alcohol. This causes decreased pressure in the lower esophageal sphincter (valve or ring of muscle between the esophagus and stomach), allowing acid from the stomach into the esophagus.  · Late evening meals and a full stomach. This increases pressure and acid production in the stomach.  · A malformed lower esophageal sphincter.  Sometimes, no cause is found.  SYMPTOMS    · Burning pain in the lower part of the mid-chest behind the breastbone and in the mid-stomach area. This may occur twice a week or more often.  · Trouble swallowing.  · Sore throat.  · Dry cough.  · Asthma-like symptoms including chest tightness, shortness of breath, or wheezing.  DIAGNOSIS    Your caregiver may be able to diagnose GERD based on your symptoms. In some cases, X-rays and other tests may be done to check for complications or to check the condition of your stomach and esophagus.  TREATMENT    Your caregiver may recommend over-the-counter or prescription medicines to help decrease acid production. Ask your caregiver before starting or adding any new medicines.    HOME CARE INSTRUCTIONS    · Change the factors that you can control. Ask your caregiver for guidance concerning weight loss, quitting smoking, and alcohol consumption.  · Avoid foods and drinks that make your symptoms worse, such as:  ¨ Caffeine or alcoholic drinks.  ¨ Chocolate.  ¨ Peppermint or mint flavorings.  ¨ Garlic and onions.  ¨ Spicy foods.  ¨ Citrus  fruits, such as oranges, rell, or limes.  ¨ Tomato-based foods such as sauce, chili, salsa, and pizza.  ¨ Fried and fatty foods.  · Avoid lying down for the 3 hours prior to your bedtime or prior to taking a nap.  · Eat small, frequent meals instead of large meals.  · Wear loose-fitting clothing. Do not wear anything tight around your waist that causes pressure on your stomach.  · Raise the head of your bed 6 to 8 inches with wood blocks to help you sleep. Extra pillows will not help.  · Only take over-the-counter or prescription medicines for pain, discomfort, or fever as directed by your caregiver.  · Do not take aspirin, ibuprofen, or other nonsteroidal anti-inflammatory drugs (NSAIDs).  SEEK IMMEDIATE MEDICAL CARE IF:    · You have pain in your arms, neck, jaw, teeth, or back.  · Your pain increases or changes in intensity or duration.  · You develop nausea, vomiting, or sweating (diaphoresis).  · You develop shortness of breath, or you faint.  · Your vomit is green, yellow, black, or looks like coffee grounds or blood.  · Your stool is red, bloody, or black.  These symptoms could be signs of other problems, such as heart disease, gastric bleeding, or esophageal bleeding.  MAKE SURE YOU:    · Understand these instructions.  · Will watch your condition.  · Will get help right away if you are not doing well or get worse.     This information is not intended to replace advice given to you by your health care provider. Make sure you discuss any questions you have with your health care provider.     Document Released: 09/27/2006 Document Revised: 01/08/2016 Document Reviewed: 04/13/2016  [employer] Interactive Patient Education ©2016 [employer]

## 2019-01-22 ENCOUNTER — ANESTHESIA (OUTPATIENT)
Dept: GASTROENTEROLOGY | Facility: HOSPITAL | Age: 39
End: 2019-01-22

## 2019-01-22 ENCOUNTER — ANESTHESIA EVENT (OUTPATIENT)
Dept: GASTROENTEROLOGY | Facility: HOSPITAL | Age: 39
End: 2019-01-22

## 2019-01-22 ENCOUNTER — HOSPITAL ENCOUNTER (OUTPATIENT)
Facility: HOSPITAL | Age: 39
Setting detail: HOSPITAL OUTPATIENT SURGERY
Discharge: HOME | End: 2019-01-22
Attending: INTERNAL MEDICINE | Admitting: INTERNAL MEDICINE

## 2019-01-22 VITALS
TEMPERATURE: 97.4 F | RESPIRATION RATE: 15 BRPM | DIASTOLIC BLOOD PRESSURE: 87 MMHG | SYSTOLIC BLOOD PRESSURE: 127 MMHG | WEIGHT: 204 LBS | BODY MASS INDEX: 32.78 KG/M2 | OXYGEN SATURATION: 97 % | HEART RATE: 72 BPM | HEIGHT: 66 IN

## 2019-01-22 DIAGNOSIS — K21.9 GASTROESOPHAGEAL REFLUX DISEASE, ESOPHAGITIS PRESENCE NOT SPECIFIED: ICD-10-CM

## 2019-01-22 PROCEDURE — 25010000002 PROPOFOL 10 MG/ML EMULSION: Performed by: NURSE ANESTHETIST, CERTIFIED REGISTERED

## 2019-01-22 PROCEDURE — 43239 EGD BIOPSY SINGLE/MULTIPLE: CPT | Performed by: INTERNAL MEDICINE

## 2019-01-22 PROCEDURE — 87081 CULTURE SCREEN ONLY: CPT | Performed by: INTERNAL MEDICINE

## 2019-01-22 RX ORDER — SODIUM CHLORIDE 0.9 % (FLUSH) 0.9 %
3 SYRINGE (ML) INJECTION AS NEEDED
Status: DISCONTINUED | OUTPATIENT
Start: 2019-01-22 | End: 2019-01-22 | Stop reason: HOSPADM

## 2019-01-22 RX ORDER — LIDOCAINE HYDROCHLORIDE 20 MG/ML
INJECTION, SOLUTION INFILTRATION; PERINEURAL AS NEEDED
Status: DISCONTINUED | OUTPATIENT
Start: 2019-01-22 | End: 2019-01-22 | Stop reason: SURG

## 2019-01-22 RX ORDER — PROPOFOL 10 MG/ML
VIAL (ML) INTRAVENOUS AS NEEDED
Status: DISCONTINUED | OUTPATIENT
Start: 2019-01-22 | End: 2019-01-22 | Stop reason: SURG

## 2019-01-22 RX ORDER — SODIUM CHLORIDE 9 MG/ML
500 INJECTION, SOLUTION INTRAVENOUS CONTINUOUS PRN
Status: DISCONTINUED | OUTPATIENT
Start: 2019-01-22 | End: 2019-01-22 | Stop reason: HOSPADM

## 2019-01-22 RX ADMIN — PROPOFOL 150 MG: 10 INJECTION, EMULSION INTRAVENOUS at 12:56

## 2019-01-22 RX ADMIN — LIDOCAINE HYDROCHLORIDE 100 MG: 20 INJECTION, SOLUTION INFILTRATION; PERINEURAL at 12:56

## 2019-01-22 RX ADMIN — SODIUM CHLORIDE 500 ML: 9 INJECTION, SOLUTION INTRAVENOUS at 12:21

## 2019-01-22 NOTE — ANESTHESIA POSTPROCEDURE EVALUATION
"Patient: Betty Weber    Procedure Summary     Date:  01/22/19 Room / Location:  [facility] ENDOSCOPY 5 /  [facility]    Anesthesia Start:  1253 Anesthesia Stop:  1310    Procedure:  ESOPHAGOGASTRODUODENOSCOPY WITH ANESTHESIA (N/A ) Diagnosis:       Gastroesophageal reflux disease, esophagitis presence not specified      (Gastroesophageal reflux disease, esophagitis presence not specified [K21.9])    Surgeon:  Khari Ramirez DO Provider:  Ming Kumari CRNA    Anesthesia Type:  general ASA Status:  2          Anesthesia Type: general  Last vitals  BP   145/100 (01/22/19 1207)   Temp   97.4 °F (36.3 °C) (01/22/19 1207)   Pulse   68 (01/22/19 1207)   Resp         SpO2   98 % (01/22/19 1207)     Post Anesthesia Care and Evaluation    Patient location during evaluation: PHASE II  Patient participation: complete - patient participated  Level of consciousness: awake and alert  Pain management: adequate  Airway patency: patent  Anesthetic complications: No anesthetic complications    Cardiovascular status: acceptable  Respiratory status: acceptable  Hydration status: acceptable    Comments: Blood pressure 145/100, pulse 68, temperature 97.4 °F (36.3 °C), temperature source Temporal, height 167.6 cm (66\"), weight 92.5 kg (204 lb), SpO2 98 %.    Pt discharged from PACU based on tom score >8      "

## 2019-01-22 NOTE — ANESTHESIA PREPROCEDURE EVALUATION
Anesthesia Evaluation     Patient summary reviewed and Nursing notes reviewed   no history of anesthetic complications:  NPO Solid Status: > 8 hours  NPO Liquid Status: > 8 hours           Airway   Mallampati: I  TM distance: >3 FB  Neck ROM: full  No difficulty expected  Dental    (+) poor dentition    Pulmonary    (+) a smoker Current,   Cardiovascular - negative cardio ROS  Exercise tolerance: good (4-7 METS)        Neuro/Psych- negative ROS  (-) seizures, TIA, CVA  GI/Hepatic/Renal/Endo    (+)  GERD,    (-) liver disease, no renal disease, diabetes    Musculoskeletal (-) negative ROS    Abdominal    Substance History - negative use     OB/GYN negative ob/gyn ROS         Other                        Anesthesia Plan    ASA 2     general   total IV anesthesia  intravenous induction   Anesthetic plan, all risks, benefits, and alternatives have been provided, discussed and informed consent has been obtained with: patient.

## 2019-01-23 LAB — UREASE TISS QL: NEGATIVE

## 2019-02-25 ENCOUNTER — OFFICE VISIT (OUTPATIENT)
Dept: GASTROENTEROLOGY | Facility: CLINIC | Age: 39
End: 2019-02-25

## 2019-02-25 VITALS
HEART RATE: 78 BPM | DIASTOLIC BLOOD PRESSURE: 84 MMHG | OXYGEN SATURATION: 97 % | TEMPERATURE: 97 F | BODY MASS INDEX: 33.27 KG/M2 | SYSTOLIC BLOOD PRESSURE: 130 MMHG | HEIGHT: 66 IN | WEIGHT: 207 LBS

## 2019-02-25 DIAGNOSIS — K21.00 GASTROESOPHAGEAL REFLUX DISEASE WITH ESOPHAGITIS: Primary | ICD-10-CM

## 2019-02-25 PROCEDURE — 99212 OFFICE O/P EST SF 10 MIN: CPT | Performed by: INTERNAL MEDICINE

## 2019-02-25 NOTE — PROGRESS NOTES
"Chief Complaint   Patient presents with   • Endoscopy     1-22-19 had endo here for results       PCP: Estevan Izaguirre, APRN    Subjective   HPI     Elainajairo Evelin Weber is a 38 y.o. female who underwent endoscopy on 1/22/19 for further evaluation of GERD  Pt tolerated procedure well without any complications.   Endoscopically She  was found to have esophagitis.  Biopsies were not taken during procedure.  Histologically biopsies taken during procedure were not taken for intestinal metaplasia  She currently denies difficulty with abdominal pain, changes in bowels.  She continues to experience intermittent odynophagia.  Taking PPI bid.  Bowels move without difficulty.  She has \"slowed down\" on caffeine intake.    Past Medical History:   Diagnosis Date   • Hypertension    • Shoulder dislocation    • TMJ (temporomandibular joint syndrome)      Outpatient Medications Marked as Taking for the 2/25/19 encounter (Office Visit) with Khari Ramirez, DO   Medication Sig Dispense Refill   • dicyclomine (BENTYL) 20 MG tablet Take 1 tablet by mouth Every 6 (Six) Hours As Needed (abdominal pain). 20 tablet 0   • pantoprazole (PROTONIX) 40 MG EC tablet Take 40 mg by mouth Daily.     • sucralfate (CARAFATE) 1 g tablet Take 1 g by mouth 4 (Four) Times a Day.       No Known Allergies  Social History     Socioeconomic History   • Marital status:      Spouse name: Not on file   • Number of children: Not on file   • Years of education: Not on file   • Highest education level: Not on file   Social Needs   • Financial resource strain: Not on file   • Food insecurity - worry: Not on file   • Food insecurity - inability: Not on file   • Transportation needs - medical: Not on file   • Transportation needs - non-medical: Not on file   Occupational History   • Not on file   Tobacco Use   • Smoking status: Current Some Day Smoker     Packs/day: 0.25     Years: 5.00     Pack years: 1.25   • Smokeless tobacco: Never Used   Substance and " Sexual Activity   • Alcohol use: No   • Drug use: No   • Sexual activity: Not on file   Other Topics Concern   • Not on file   Social History Narrative         Family History   Problem Relation Age of Onset   • Colon cancer Neg Hx    • Colon polyps Neg Hx    • Esophageal cancer Neg Hx      Review of Systems   Constitutional: Negative for fatigue, fever and unexpected weight change.   HENT: Negative for hearing loss, sore throat and voice change.    Eyes: Negative for visual disturbance.   Respiratory: Negative for cough, shortness of breath and wheezing.    Cardiovascular: Negative for chest pain and palpitations.   Gastrointestinal: Negative for abdominal pain, blood in stool and vomiting.   Endocrine: Negative for polydipsia and polyuria.   Genitourinary: Negative for difficulty urinating, dysuria, hematuria and urgency.   Musculoskeletal: Negative for joint swelling and myalgias.   Skin: Negative for color change, rash and wound.   Neurological: Negative for dizziness, tremors, seizures and syncope.   Hematological: Does not bruise/bleed easily.   Psychiatric/Behavioral: Negative for agitation and confusion. The patient is not nervous/anxious.      Objective   Vitals:    02/25/19 1406   BP: 130/84   Pulse: 78   Temp: 97 °F (36.1 °C)   SpO2: 97%     Physical Exam   Constitutional: She is oriented to person, place, and time. She appears well-developed and well-nourished. She is cooperative.   HENT:   Head: Normocephalic and atraumatic.   Eyes: Conjunctivae are normal. Pupils are equal, round, and reactive to light. No scleral icterus.   Neck: Normal range of motion. Neck supple. No JVD present. No thyroid mass and no thyromegaly present.   Cardiovascular: Normal rate, regular rhythm and normal heart sounds. Exam reveals no gallop and no friction rub.   No murmur heard.  Pulmonary/Chest: Effort normal and breath sounds normal. No accessory muscle usage. No respiratory distress. She has no wheezes. She has no  rales.   Abdominal: Soft. Normal appearance and bowel sounds are normal. She exhibits no distension, no ascites and no mass. There is no hepatosplenomegaly. There is no tenderness. There is no rebound and no guarding.   Musculoskeletal: Normal range of motion. She exhibits no edema or tenderness.     Vascular Status -  Her right foot exhibits normal foot vasculature  and no edema. Her left foot exhibits normal foot vasculature  and no edema.  Lymphadenopathy:     She has no cervical adenopathy.   Neurological: She is alert and oriented to person, place, and time. She has normal strength. Gait normal.   Skin: Skin is warm, dry and intact. No rash noted.     Imaging Results (most recent)     None        Body mass index is 33.41 kg/m².  Assessment/Plan   Betty was seen today for endoscopy.    Diagnoses and all orders for this visit:    Gastroesophageal reflux disease with esophagitis      * Surgery not found *     Suggest to decrease caffeine intake, stop smoking. Explained she is at increased risk to experience sx if she does not avoid caffeine/nicoteine  Cont bid PPI    Patient's Body mass index is 33.41 kg/m². BMI is above normal parameters. Recommendations include: no follow-up required.    I advised Betty of the risks of continuing to use tobacco, and I provided her with tobacco cessation educational materials in the After Visit Summary.     During this visit, I spent 3 minutes counseling the patient regarding tobacco cessation.

## 2019-12-15 ENCOUNTER — HOSPITAL ENCOUNTER (EMERGENCY)
Facility: HOSPITAL | Age: 39
Discharge: HOME | End: 2019-12-15
Admitting: EMERGENCY MEDICINE

## 2019-12-15 VITALS
HEIGHT: 66 IN | RESPIRATION RATE: 16 BRPM | HEART RATE: 68 BPM | WEIGHT: 214 LBS | BODY MASS INDEX: 34.39 KG/M2 | OXYGEN SATURATION: 94 % | SYSTOLIC BLOOD PRESSURE: 140 MMHG | DIASTOLIC BLOOD PRESSURE: 90 MMHG | TEMPERATURE: 98.3 F

## 2019-12-15 DIAGNOSIS — J02.9 ACUTE VIRAL PHARYNGITIS: Primary | ICD-10-CM

## 2019-12-15 LAB
FLUAV AG NPH QL: NEGATIVE
FLUBV AG NPH QL IA: NEGATIVE
HETEROPH AB SER QL LA: NEGATIVE
S PYO AG THROAT QL: NEGATIVE

## 2019-12-15 PROCEDURE — 87880 STREP A ASSAY W/OPTIC: CPT | Performed by: PHYSICIAN ASSISTANT

## 2019-12-15 PROCEDURE — 36415 COLL VENOUS BLD VENIPUNCTURE: CPT

## 2019-12-15 PROCEDURE — 86308 HETEROPHILE ANTIBODY SCREEN: CPT | Performed by: PHYSICIAN ASSISTANT

## 2019-12-15 PROCEDURE — 99283 EMERGENCY DEPT VISIT LOW MDM: CPT

## 2019-12-15 PROCEDURE — 87804 INFLUENZA ASSAY W/OPTIC: CPT | Performed by: PHYSICIAN ASSISTANT

## 2019-12-15 PROCEDURE — 87081 CULTURE SCREEN ONLY: CPT | Performed by: PHYSICIAN ASSISTANT

## 2019-12-15 RX ORDER — METHYLPREDNISOLONE 4 MG/1
TABLET ORAL
Qty: 21 TABLET | Refills: 0 | Status: SHIPPED | OUTPATIENT
Start: 2019-12-15 | End: 2021-01-10 | Stop reason: HOSPADM

## 2019-12-15 NOTE — DISCHARGE INSTRUCTIONS
Rest, keep hydrated.  Take medication as directed.  F/u with PCP/ENT.  Return with new/worsening symptoms.         Pharyngitis    Pharyngitis is redness, pain, and swelling (inflammation) of the throat (pharynx). It is a very common cause of sore throat. Pharyngitis can be caused by a bacteria, but it is usually caused by a virus. Most cases of pharyngitis get better on their own without treatment.  What are the causes?  This condition may be caused by:  · Infection by viruses (viral). Viral pharyngitis spreads from person to person (is contagious) through coughing, sneezing, and sharing of personal items or utensils such as cups, forks, spoons, and toothbrushes.  · Infection by bacteria (bacterial). Bacterial pharyngitis may be spread by touching the nose or face after coming in contact with the bacteria, or through more intimate contact, such as kissing.  · Allergies. Allergies can cause buildup of mucus in the throat (post-nasal drip), leading to inflammation and irritation. Allergies can also cause blocked nasal passages, forcing breathing through the mouth, which dries and irritates the throat.  What increases the risk?  You are more likely to develop this condition if:  · You are 5-24 years old.  · You are exposed to crowded environments such as , school, or dormitory living.  · You live in a cold climate.  · You have a weakened disease-fighting (immune) system.  What are the signs or symptoms?  Symptoms of this condition vary by the cause (viral, bacterial, or allergies) and can include:  · Sore throat.  · Fatigue.  · Low-grade fever.  · Headache.  · Joint pain and muscle aches.  · Skin rashes.  · Swollen glands in the throat (lymph nodes).  · Plaque-like film on the throat or tonsils. This is often a symptom of bacterial pharyngitis.  · Vomiting.  · Stuffy nose (nasal congestion).  · Cough.  · Red, itchy eyes (conjunctivitis).  · Loss of appetite.  How is this diagnosed?  This condition is often  diagnosed based on your medical history and a physical exam. Your health care provider will ask you questions about your illness and your symptoms. A swab of your throat may be done to check for bacteria (rapid strep test). Other lab tests may also be done, depending on the suspected cause, but these are rare.  How is this treated?  This condition usually gets better in 3-4 days without medicine. Bacterial pharyngitis may be treated with antibiotic medicines.  Follow these instructions at home:  · Take over-the-counter and prescription medicines only as told by your health care provider.  ? If you were prescribed an antibiotic medicine, take it as told by your health care provider. Do not stop taking the antibiotic even if you start to feel better.  ? Do not give children aspirin because of the association with Reye syndrome.  · Drink enough water and fluids to keep your urine clear or pale yellow.  · Get a lot of rest.  · Gargle with a salt-water mixture 3-4 times a day or as needed. To make a salt-water mixture, completely dissolve ½-1 tsp of salt in 1 cup of warm water.  · If your health care provider approves, you may use throat lozenges or sprays to soothe your throat.  Contact a health care provider if:  · You have large, tender lumps in your neck.  · You have a rash.  · You cough up green, yellow-brown, or bloody spit.  Get help right away if:  · Your neck becomes stiff.  · You drool or are unable to swallow liquids.  · You cannot drink or take medicines without vomiting.  · You have severe pain that does not go away, even after you take medicine.  · You have trouble breathing, and it is not caused by a stuffy nose.  · You have new pain and swelling in your joints such as the knees, ankles, wrists, or elbows.  Summary  · Pharyngitis is redness, pain, and swelling (inflammation) of the throat (pharynx).  · While pharyngitis can be caused by a bacteria, the most common causes are viral.  · Most cases of  pharyngitis get better on their own without treatment.  · Bacterial pharyngitis is treated with antibiotic medicines.  This information is not intended to replace advice given to you by your health care provider. Make sure you discuss any questions you have with your health care provider.  Document Released: 12/18/2006 Document Revised: 01/23/2018 Document Reviewed: 01/23/2018  [employer] Interactive Patient Education © 2019 [employer]

## 2019-12-15 NOTE — ED PROVIDER NOTES
Subjective     History provided by:  Patient  URI   Presenting symptoms: congestion, cough, ear pain, fatigue, rhinorrhea and sore throat    Presenting symptoms: no fever    Congestion:     Location:  Nasal    Interferes with sleep: no      Interferes with eating/drinking: no    Cough:     Cough characteristics:  Dry  Ear pain:     Location:  Left    Severity:  Moderate    Onset quality:  Gradual    Progression:  Resolved  Severity:  Moderate  Onset quality:  Gradual  Timing:  Constant  Progression:  Waxing and waning  Chronicity:  New  Relieved by:  Nothing  Worsened by:  Drinking and eating  Ineffective treatments:  None tried  Associated symptoms: no arthralgias, no headaches, no myalgias, no neck pain, no sinus pain, no sneezing and no wheezing    Associated symptoms comment:  Painful to swallow but no difficulty passing solids/liquids.  Increased pain with supine position but no difficulty breathing or drooling.   Risk factors: sick contacts (kids have had similar symptoms recently)    Risk factors: no chronic respiratory disease, no diabetes mellitus, no immunosuppression, no recent illness and no recent travel        Review of Systems   Constitutional: Positive for chills and fatigue. Negative for appetite change, diaphoresis and fever.   HENT: Positive for congestion, ear pain, postnasal drip, rhinorrhea and sore throat. Negative for drooling, ear discharge, facial swelling, sinus pressure, sinus pain, sneezing, trouble swallowing and voice change.    Eyes: Negative for pain, discharge, redness and itching.   Respiratory: Positive for cough. Negative for choking, chest tightness, shortness of breath, wheezing and stridor.    Cardiovascular: Negative for chest pain and palpitations.   Gastrointestinal: Negative for abdominal pain, nausea and vomiting.   Genitourinary: Negative for decreased urine volume, difficulty urinating and flank pain.   Musculoskeletal: Negative for arthralgias, joint swelling,  "myalgias, neck pain and neck stiffness.   Skin: Negative for color change, pallor and rash.   Neurological: Negative for weakness, light-headedness and headaches.   Hematological: Does not bruise/bleed easily.   Psychiatric/Behavioral: Negative for behavioral problems and confusion.       Past Medical History:   Diagnosis Date   • GERD (gastroesophageal reflux disease)    • Hypertension    • Shoulder dislocation    • TMJ (temporomandibular joint syndrome)        No Known Allergies    Past Surgical History:   Procedure Laterality Date   • ENDOSCOPY N/A 1/22/2019    Procedure: ESOPHAGOGASTRODUODENOSCOPY WITH ANESTHESIA;  Surgeon: Khari Ramirez DO;  Location: [facility] ENDOSCOPY;  Service: Gastroenterology   • HYSTERECTOMY         Family History   Problem Relation Age of Onset   • Colon cancer Neg Hx    • Colon polyps Neg Hx    • Esophageal cancer Neg Hx        Social History     Socioeconomic History   • Marital status:      Spouse name: Not on file   • Number of children: Not on file   • Years of education: Not on file   • Highest education level: Not on file   Tobacco Use   • Smoking status: Current Some Day Smoker     Packs/day: 0.25     Years: 5.00     Pack years: 1.25   • Smokeless tobacco: Never Used   Substance and Sexual Activity   • Alcohol use: No   • Drug use: No   Social History Narrative         /90   Pulse 68   Temp 98.3 °F (36.8 °C)   Resp 16   Ht 167.6 cm (66\")   Wt 97.1 kg (214 lb)   SpO2 94%   BMI 34.54 kg/m²       Objective   Physical Exam   Constitutional: She is oriented to person, place, and time. She appears well-developed and well-nourished.  Non-toxic appearance. She does not appear ill. No distress.   HENT:   Head: Normocephalic and atraumatic.   Right Ear: Hearing, tympanic membrane and ear canal normal.   Left Ear: Hearing, tympanic membrane and ear canal normal.   Mouth/Throat: Uvula is midline and mucous membranes are normal. Mucous membranes are not pale. No " "uvula swelling. Posterior oropharyngeal erythema (mild) present. No oropharyngeal exudate, posterior oropharyngeal edema or tonsillar abscesses. Tonsils are 1+ on the right. Tonsils are 1+ on the left. No tonsillar exudate.   Eyes: Pupils are equal, round, and reactive to light.   Neck: Normal range of motion. Neck supple.   Cardiovascular: Normal rate, regular rhythm, normal heart sounds and intact distal pulses.   Pulmonary/Chest: Breath sounds normal. No stridor. She has no wheezes. She has no rhonchi. She has no rales.   Dry cough   Abdominal: Soft. Bowel sounds are normal.   Lymphadenopathy:     She has cervical adenopathy (mild).   Neurological: She is alert and oriented to person, place, and time.   Skin: Skin is warm and dry. Capillary refill takes less than 2 seconds. No rash noted. No pallor.   Psychiatric: She has a normal mood and affect. Her behavior is normal.   Nursing note and vitals reviewed.      Procedures           ED Course  ED Course as of Dec 15 1415   Sun Dec 15, 2019   1041 Strep, mono and flu negative.  Suspect viral pharyngitis.  No evidence of complicatoin at this time.  Vital signs stable.  Afebrile normal O2.  NAD on re-eval.  Pt states she just wants her tonsils taken out.  Advised to f/u with ENT and given contact information.  Given Medrol Dosepak to continue at home- discussed appropriate use and potential side effects.  Discussed s/s of worsening condition that would warrant re-eval, she verbalized understanding.  F/u with PCP, return with any acute changes.    [DC]      ED Course User Index  [DC] Castleman, Danna D, PA      /90   Pulse 68   Temp 98.3 °F (36.8 °C)   Resp 16   Ht 167.6 cm (66\")   Wt 97.1 kg (214 lb)   SpO2 94%   BMI 34.54 kg/m²                     No data recorded                        MDM  Number of Diagnoses or Management Options  Acute viral pharyngitis: new and does not require workup     Amount and/or Complexity of Data Reviewed  Clinical lab " tests: reviewed    Patient Progress  Patient progress: improved      Final diagnoses:   Acute viral pharyngitis              Castleman, Danna D, PA  12/15/19 3885

## 2019-12-17 LAB — BACTERIA SPEC AEROBE CULT: NORMAL

## 2020-07-16 ENCOUNTER — TRANSCRIBE ORDERS (OUTPATIENT)
Dept: ADMINISTRATIVE | Facility: HOSPITAL | Age: 40
End: 2020-07-16

## 2020-07-16 DIAGNOSIS — Z12.31 ENCOUNTER FOR SCREENING MAMMOGRAM FOR MALIGNANT NEOPLASM OF BREAST: Primary | ICD-10-CM

## 2020-07-30 ENCOUNTER — HOSPITAL ENCOUNTER (OUTPATIENT)
Dept: MAMMOGRAPHY | Facility: HOSPITAL | Age: 40
Discharge: HOME | End: 2020-07-30
Admitting: INTERNAL MEDICINE

## 2020-07-30 DIAGNOSIS — Z12.31 ENCOUNTER FOR SCREENING MAMMOGRAM FOR MALIGNANT NEOPLASM OF BREAST: ICD-10-CM

## 2020-07-30 PROCEDURE — 77063 BREAST TOMOSYNTHESIS BI: CPT

## 2020-07-30 PROCEDURE — 77067 SCR MAMMO BI INCL CAD: CPT

## 2020-08-21 ENCOUNTER — HOSPITAL ENCOUNTER (OUTPATIENT)
Dept: MAMMOGRAPHY | Facility: HOSPITAL | Age: 40
Discharge: HOME | End: 2020-08-21
Admitting: INTERNAL MEDICINE

## 2020-08-21 ENCOUNTER — HOSPITAL ENCOUNTER (OUTPATIENT)
Dept: ULTRASOUND IMAGING | Facility: HOSPITAL | Age: 40
Discharge: HOME | End: 2020-08-21

## 2020-08-21 DIAGNOSIS — R92.8 ABNORMAL MAMMOGRAM: ICD-10-CM

## 2020-08-21 PROCEDURE — G0279 TOMOSYNTHESIS, MAMMO: HCPCS

## 2020-08-21 PROCEDURE — 77065 DX MAMMO INCL CAD UNI: CPT

## 2021-01-09 ENCOUNTER — APPOINTMENT (OUTPATIENT)
Dept: CT IMAGING | Facility: HOSPITAL | Age: 41
End: 2021-01-09

## 2021-01-09 ENCOUNTER — APPOINTMENT (OUTPATIENT)
Dept: GENERAL RADIOLOGY | Facility: HOSPITAL | Age: 41
End: 2021-01-09

## 2021-01-09 ENCOUNTER — HOSPITAL ENCOUNTER (INPATIENT)
Facility: HOSPITAL | Age: 41
LOS: 1 days | Discharge: HOME | End: 2021-01-10
Attending: EMERGENCY MEDICINE | Admitting: FAMILY MEDICINE

## 2021-01-09 DIAGNOSIS — J45.901 REACTIVE AIRWAY DISEASE WITH ACUTE EXACERBATION, UNSPECIFIED ASTHMA SEVERITY, UNSPECIFIED WHETHER PERSISTENT: ICD-10-CM

## 2021-01-09 DIAGNOSIS — R09.02 HYPOXIC: Primary | ICD-10-CM

## 2021-01-09 DIAGNOSIS — I10 ESSENTIAL HYPERTENSION: ICD-10-CM

## 2021-01-09 PROBLEM — R07.89 CHEST PAIN, ATYPICAL: Status: ACTIVE | Noted: 2021-01-09

## 2021-01-09 LAB
ALBUMIN SERPL-MCNC: 4.2 G/DL (ref 3.5–5.2)
ALBUMIN/GLOB SERPL: 1.2 G/DL
ALP SERPL-CCNC: 118 U/L (ref 39–117)
ALT SERPL W P-5'-P-CCNC: 20 U/L (ref 1–33)
AMPHET+METHAMPHET UR QL: NEGATIVE
AMPHETAMINES UR QL: NEGATIVE
ANION GAP SERPL CALCULATED.3IONS-SCNC: 9 MMOL/L (ref 5–15)
APTT PPP: 26 SECONDS (ref 24.1–35)
ARTERIAL PATENCY WRIST A: ABNORMAL
AST SERPL-CCNC: 14 U/L (ref 1–32)
ATMOSPHERIC PRESS: 758 MMHG
BARBITURATES UR QL SCN: NEGATIVE
BASE EXCESS BLDA CALC-SCNC: 1.4 MMOL/L (ref 0–2)
BASOPHILS # BLD AUTO: 0.1 10*3/MM3 (ref 0–0.2)
BASOPHILS NFR BLD AUTO: 0.7 % (ref 0–1.5)
BDY SITE: ABNORMAL
BENZODIAZ UR QL SCN: NEGATIVE
BILIRUB SERPL-MCNC: 0.3 MG/DL (ref 0–1.2)
BODY TEMPERATURE: 37 C
BUN SERPL-MCNC: 12 MG/DL (ref 6–20)
BUN/CREAT SERPL: 16.9 (ref 7–25)
BUPRENORPHINE SERPL-MCNC: NEGATIVE NG/ML
CALCIUM SPEC-SCNC: 8.9 MG/DL (ref 8.6–10.5)
CANNABINOIDS SERPL QL: NEGATIVE
CHLORIDE SERPL-SCNC: 104 MMOL/L (ref 98–107)
CK SERPL-CCNC: 95 U/L (ref 20–180)
CO2 SERPL-SCNC: 26 MMOL/L (ref 22–29)
COCAINE UR QL: NEGATIVE
CREAT SERPL-MCNC: 0.71 MG/DL (ref 0.57–1)
CRP SERPL-MCNC: 0.72 MG/DL (ref 0–0.5)
D DIMER PPP FEU-MCNC: 0.59 MG/L (FEU) (ref 0–0.5)
DEPRECATED RDW RBC AUTO: 42.3 FL (ref 37–54)
EOSINOPHIL # BLD AUTO: 0.11 10*3/MM3 (ref 0–0.4)
EOSINOPHIL NFR BLD AUTO: 0.8 % (ref 0.3–6.2)
ERYTHROCYTE [DISTWIDTH] IN BLOOD BY AUTOMATED COUNT: 12.9 % (ref 12.3–15.4)
GAS FLOW AIRWAY: 2 LPM
GFR SERPL CREATININE-BSD FRML MDRD: 91 ML/MIN/1.73
GLOBULIN UR ELPH-MCNC: 3.4 GM/DL
GLUCOSE SERPL-MCNC: 131 MG/DL (ref 65–99)
HCG SERPL QL: NEGATIVE
HCO3 BLDA-SCNC: 27.1 MMOL/L (ref 20–26)
HCT VFR BLD AUTO: 40.8 % (ref 34–46.6)
HGB BLD-MCNC: 13.2 G/DL (ref 12–15.9)
HOLD SPECIMEN: NORMAL
HOLD SPECIMEN: NORMAL
IMM GRANULOCYTES # BLD AUTO: 0.04 10*3/MM3 (ref 0–0.05)
IMM GRANULOCYTES NFR BLD AUTO: 0.3 % (ref 0–0.5)
INR PPP: 0.92 (ref 0.91–1.09)
LYMPHOCYTES # BLD AUTO: 2.22 10*3/MM3 (ref 0.7–3.1)
LYMPHOCYTES NFR BLD AUTO: 16.3 % (ref 19.6–45.3)
Lab: ABNORMAL
MCH RBC QN AUTO: 29.1 PG (ref 26.6–33)
MCHC RBC AUTO-ENTMCNC: 32.4 G/DL (ref 31.5–35.7)
MCV RBC AUTO: 89.9 FL (ref 79–97)
METHADONE UR QL SCN: NEGATIVE
MODALITY: ABNORMAL
MONOCYTES # BLD AUTO: 1.01 10*3/MM3 (ref 0.1–0.9)
MONOCYTES NFR BLD AUTO: 7.4 % (ref 5–12)
NEUTROPHILS NFR BLD AUTO: 10.12 10*3/MM3 (ref 1.7–7)
NEUTROPHILS NFR BLD AUTO: 74.5 % (ref 42.7–76)
NRBC BLD AUTO-RTO: 0 /100 WBC (ref 0–0.2)
NT-PROBNP SERPL-MCNC: 39.4 PG/ML (ref 0–450)
OPIATES UR QL: NEGATIVE
OXYCODONE UR QL SCN: NEGATIVE
PCO2 BLDA: 46 MM HG (ref 35–45)
PCO2 TEMP ADJ BLD: 46 MM HG (ref 35–45)
PCP UR QL SCN: NEGATIVE
PH BLDA: 7.38 PH UNITS (ref 7.35–7.45)
PH, TEMP CORRECTED: 7.38 PH UNITS (ref 7.35–7.45)
PLATELET # BLD AUTO: 329 10*3/MM3 (ref 140–450)
PMV BLD AUTO: 10.5 FL (ref 6–12)
PO2 BLDA: 63.9 MM HG (ref 83–108)
PO2 TEMP ADJ BLD: 63.9 MM HG (ref 83–108)
POTASSIUM SERPL-SCNC: 4 MMOL/L (ref 3.5–5.2)
PROPOXYPH UR QL: NEGATIVE
PROT SERPL-MCNC: 7.6 G/DL (ref 6–8.5)
PROTHROMBIN TIME: 12 SECONDS (ref 11.9–14.6)
RBC # BLD AUTO: 4.54 10*6/MM3 (ref 3.77–5.28)
SAO2 % BLDCOA: 93.6 % (ref 94–99)
SARS-COV-2 RNA PNL SPEC NAA+PROBE: NOT DETECTED
SODIUM SERPL-SCNC: 139 MMOL/L (ref 136–145)
TRICYCLICS UR QL SCN: NEGATIVE
TROPONIN T SERPL-MCNC: <0.01 NG/ML (ref 0–0.03)
VENTILATOR MODE: ABNORMAL
WBC # BLD AUTO: 13.6 10*3/MM3 (ref 3.4–10.8)
WHOLE BLOOD HOLD SPECIMEN: NORMAL
WHOLE BLOOD HOLD SPECIMEN: NORMAL

## 2021-01-09 PROCEDURE — 86140 C-REACTIVE PROTEIN: CPT | Performed by: EMERGENCY MEDICINE

## 2021-01-09 PROCEDURE — 0 IOPAMIDOL PER 1 ML: Performed by: EMERGENCY MEDICINE

## 2021-01-09 PROCEDURE — 85730 THROMBOPLASTIN TIME PARTIAL: CPT | Performed by: EMERGENCY MEDICINE

## 2021-01-09 PROCEDURE — 63710000001 PREDNISONE PER 1 MG: Performed by: FAMILY MEDICINE

## 2021-01-09 PROCEDURE — 82550 ASSAY OF CK (CPK): CPT | Performed by: EMERGENCY MEDICINE

## 2021-01-09 PROCEDURE — 93005 ELECTROCARDIOGRAM TRACING: CPT | Performed by: EMERGENCY MEDICINE

## 2021-01-09 PROCEDURE — 94799 UNLISTED PULMONARY SVC/PX: CPT

## 2021-01-09 PROCEDURE — 85379 FIBRIN DEGRADATION QUANT: CPT | Performed by: EMERGENCY MEDICINE

## 2021-01-09 PROCEDURE — 85610 PROTHROMBIN TIME: CPT | Performed by: EMERGENCY MEDICINE

## 2021-01-09 PROCEDURE — 25010000002 ONDANSETRON PER 1 MG: Performed by: EMERGENCY MEDICINE

## 2021-01-09 PROCEDURE — 25010000002 CEFTRIAXONE PER 250 MG: Performed by: EMERGENCY MEDICINE

## 2021-01-09 PROCEDURE — 85025 COMPLETE CBC W/AUTO DIFF WBC: CPT | Performed by: EMERGENCY MEDICINE

## 2021-01-09 PROCEDURE — 25010000002 AZITHROMYCIN PER 500 MG: Performed by: EMERGENCY MEDICINE

## 2021-01-09 PROCEDURE — 84484 ASSAY OF TROPONIN QUANT: CPT | Performed by: EMERGENCY MEDICINE

## 2021-01-09 PROCEDURE — 84484 ASSAY OF TROPONIN QUANT: CPT | Performed by: FAMILY MEDICINE

## 2021-01-09 PROCEDURE — 71045 X-RAY EXAM CHEST 1 VIEW: CPT

## 2021-01-09 PROCEDURE — 83880 ASSAY OF NATRIURETIC PEPTIDE: CPT | Performed by: EMERGENCY MEDICINE

## 2021-01-09 PROCEDURE — 94640 AIRWAY INHALATION TREATMENT: CPT

## 2021-01-09 PROCEDURE — 80306 DRUG TEST PRSMV INSTRMNT: CPT | Performed by: EMERGENCY MEDICINE

## 2021-01-09 PROCEDURE — 82803 BLOOD GASES ANY COMBINATION: CPT

## 2021-01-09 PROCEDURE — 71275 CT ANGIOGRAPHY CHEST: CPT

## 2021-01-09 PROCEDURE — 80053 COMPREHEN METABOLIC PANEL: CPT | Performed by: EMERGENCY MEDICINE

## 2021-01-09 PROCEDURE — 36600 WITHDRAWAL OF ARTERIAL BLOOD: CPT

## 2021-01-09 PROCEDURE — 25010000002 ENOXAPARIN PER 10 MG: Performed by: FAMILY MEDICINE

## 2021-01-09 PROCEDURE — 87635 SARS-COV-2 COVID-19 AMP PRB: CPT | Performed by: EMERGENCY MEDICINE

## 2021-01-09 PROCEDURE — 99285 EMERGENCY DEPT VISIT HI MDM: CPT

## 2021-01-09 PROCEDURE — 25010000002 METHYLPREDNISOLONE PER 125 MG: Performed by: EMERGENCY MEDICINE

## 2021-01-09 PROCEDURE — 84703 CHORIONIC GONADOTROPIN ASSAY: CPT | Performed by: EMERGENCY MEDICINE

## 2021-01-09 PROCEDURE — 93010 ELECTROCARDIOGRAM REPORT: CPT | Performed by: INTERNAL MEDICINE

## 2021-01-09 RX ORDER — PANTOPRAZOLE SODIUM 40 MG/1
40 TABLET, DELAYED RELEASE ORAL DAILY
Status: DISCONTINUED | OUTPATIENT
Start: 2021-01-09 | End: 2021-01-10 | Stop reason: HOSPADM

## 2021-01-09 RX ORDER — ONDANSETRON 4 MG/1
4 TABLET, FILM COATED ORAL EVERY 6 HOURS PRN
Status: DISCONTINUED | OUTPATIENT
Start: 2021-01-09 | End: 2021-01-10 | Stop reason: HOSPADM

## 2021-01-09 RX ORDER — METHYLPREDNISOLONE SODIUM SUCCINATE 125 MG/2ML
125 INJECTION, POWDER, LYOPHILIZED, FOR SOLUTION INTRAMUSCULAR; INTRAVENOUS ONCE
Status: COMPLETED | OUTPATIENT
Start: 2021-01-09 | End: 2021-01-09

## 2021-01-09 RX ORDER — LABETALOL HYDROCHLORIDE 5 MG/ML
10 INJECTION, SOLUTION INTRAVENOUS ONCE
Status: DISCONTINUED | OUTPATIENT
Start: 2021-01-09 | End: 2021-01-10 | Stop reason: HOSPADM

## 2021-01-09 RX ORDER — SODIUM CHLORIDE 0.9 % (FLUSH) 0.9 %
10 SYRINGE (ML) INJECTION AS NEEDED
Status: DISCONTINUED | OUTPATIENT
Start: 2021-01-09 | End: 2021-01-10 | Stop reason: HOSPADM

## 2021-01-09 RX ORDER — SODIUM CHLORIDE 0.9 % (FLUSH) 0.9 %
10 SYRINGE (ML) INJECTION EVERY 12 HOURS SCHEDULED
Status: DISCONTINUED | OUTPATIENT
Start: 2021-01-09 | End: 2021-01-10 | Stop reason: HOSPADM

## 2021-01-09 RX ORDER — NITROGLYCERIN 0.4 MG/1
0.4 TABLET SUBLINGUAL
Status: DISCONTINUED | OUTPATIENT
Start: 2021-01-09 | End: 2021-01-10 | Stop reason: HOSPADM

## 2021-01-09 RX ORDER — LABETALOL HYDROCHLORIDE 5 MG/ML
10 INJECTION, SOLUTION INTRAVENOUS ONCE
Status: COMPLETED | OUTPATIENT
Start: 2021-01-09 | End: 2021-01-09

## 2021-01-09 RX ORDER — LISINOPRIL 20 MG/1
20 TABLET ORAL
Status: DISCONTINUED | OUTPATIENT
Start: 2021-01-09 | End: 2021-01-10 | Stop reason: HOSPADM

## 2021-01-09 RX ORDER — ALBUTEROL SULFATE 2.5 MG/3ML
2.5 SOLUTION RESPIRATORY (INHALATION) ONCE
Status: COMPLETED | OUTPATIENT
Start: 2021-01-09 | End: 2021-01-09

## 2021-01-09 RX ORDER — ACETAMINOPHEN 325 MG/1
650 TABLET ORAL EVERY 4 HOURS PRN
Status: DISCONTINUED | OUTPATIENT
Start: 2021-01-09 | End: 2021-01-10 | Stop reason: HOSPADM

## 2021-01-09 RX ORDER — PREDNISONE 20 MG/1
40 TABLET ORAL
Status: DISCONTINUED | OUTPATIENT
Start: 2021-01-09 | End: 2021-01-10 | Stop reason: HOSPADM

## 2021-01-09 RX ORDER — ONDANSETRON 2 MG/ML
4 INJECTION INTRAMUSCULAR; INTRAVENOUS ONCE
Status: COMPLETED | OUTPATIENT
Start: 2021-01-09 | End: 2021-01-09

## 2021-01-09 RX ORDER — SUCRALFATE 1 G/1
1 TABLET ORAL
Status: DISCONTINUED | OUTPATIENT
Start: 2021-01-09 | End: 2021-01-10 | Stop reason: HOSPADM

## 2021-01-09 RX ORDER — IPRATROPIUM BROMIDE AND ALBUTEROL SULFATE 2.5; .5 MG/3ML; MG/3ML
3 SOLUTION RESPIRATORY (INHALATION)
Status: DISCONTINUED | OUTPATIENT
Start: 2021-01-09 | End: 2021-01-10 | Stop reason: HOSPADM

## 2021-01-09 RX ORDER — AZITHROMYCIN 250 MG/1
250 TABLET, FILM COATED ORAL
Status: DISCONTINUED | OUTPATIENT
Start: 2021-01-10 | End: 2021-01-10 | Stop reason: HOSPADM

## 2021-01-09 RX ORDER — IPRATROPIUM BROMIDE AND ALBUTEROL SULFATE 2.5; .5 MG/3ML; MG/3ML
3 SOLUTION RESPIRATORY (INHALATION) ONCE
Status: COMPLETED | OUTPATIENT
Start: 2021-01-09 | End: 2021-01-09

## 2021-01-09 RX ORDER — GUAIFENESIN 600 MG/1
1200 TABLET, EXTENDED RELEASE ORAL EVERY 12 HOURS SCHEDULED
Status: DISCONTINUED | OUTPATIENT
Start: 2021-01-09 | End: 2021-01-10 | Stop reason: HOSPADM

## 2021-01-09 RX ADMIN — SUCRALFATE 1 G: 1 TABLET ORAL at 21:01

## 2021-01-09 RX ADMIN — IPRATROPIUM BROMIDE AND ALBUTEROL SULFATE 3 ML: 2.5; .5 SOLUTION RESPIRATORY (INHALATION) at 20:26

## 2021-01-09 RX ADMIN — ALBUTEROL SULFATE 2.5 MG: 2.5 SOLUTION RESPIRATORY (INHALATION) at 07:21

## 2021-01-09 RX ADMIN — IPRATROPIUM BROMIDE AND ALBUTEROL SULFATE 3 ML: 2.5; .5 SOLUTION RESPIRATORY (INHALATION) at 07:16

## 2021-01-09 RX ADMIN — PREDNISONE 40 MG: 20 TABLET ORAL at 17:03

## 2021-01-09 RX ADMIN — IOPAMIDOL 100 ML: 755 INJECTION, SOLUTION INTRAVENOUS at 10:06

## 2021-01-09 RX ADMIN — ACETAMINOPHEN 650 MG: 325 TABLET ORAL at 17:07

## 2021-01-09 RX ADMIN — ENOXAPARIN SODIUM 40 MG: 40 INJECTION, SOLUTION INTRAVENOUS; SUBCUTANEOUS at 15:37

## 2021-01-09 RX ADMIN — PANTOPRAZOLE SODIUM 40 MG: 40 TABLET, DELAYED RELEASE ORAL at 15:37

## 2021-01-09 RX ADMIN — AZITHROMYCIN DIHYDRATE 500 MG: 500 INJECTION, POWDER, LYOPHILIZED, FOR SOLUTION INTRAVENOUS at 12:46

## 2021-01-09 RX ADMIN — ONDANSETRON HYDROCHLORIDE 4 MG: 2 SOLUTION INTRAMUSCULAR; INTRAVENOUS at 07:07

## 2021-01-09 RX ADMIN — LISINOPRIL 20 MG: 20 TABLET ORAL at 15:42

## 2021-01-09 RX ADMIN — LABETALOL HYDROCHLORIDE 10 MG: 5 INJECTION, SOLUTION INTRAVENOUS at 08:08

## 2021-01-09 RX ADMIN — SODIUM CHLORIDE, PRESERVATIVE FREE 10 ML: 5 INJECTION INTRAVENOUS at 21:01

## 2021-01-09 RX ADMIN — GUAIFENESIN 1200 MG: 600 TABLET, EXTENDED RELEASE ORAL at 21:01

## 2021-01-09 RX ADMIN — METHYLPREDNISOLONE SODIUM SUCCINATE 125 MG: 125 INJECTION, POWDER, FOR SOLUTION INTRAMUSCULAR; INTRAVENOUS at 06:54

## 2021-01-09 RX ADMIN — SUCRALFATE 1 G: 1 TABLET ORAL at 17:03

## 2021-01-09 RX ADMIN — SODIUM CHLORIDE 1 G: 900 INJECTION INTRAVENOUS at 12:28

## 2021-01-09 NOTE — ED PROVIDER NOTES
Subjective   This patient is 40 years old came to the ER via ambulance she not able to give a history because her whole body hurts and she cannot sit up in bed for me to listen to her chest since her body hurts she is having cough and myalgias.  She has had history hypertension of the take any medications      Chest Pain  Pain location:  R chest and L chest  Pain quality: aching and tightness    Pain radiates to:  Does not radiate  Pain severity:  Moderate  Onset quality:  Gradual  Duration:  5 days  Timing:  Constant  Progression:  Unchanged  Chronicity:  New  Context: breathing    Context: not drug use, not eating, not lifting, not movement, not raising an arm, not at rest and not stress    Relieved by:  Nothing  Worsened by:  Coughing and deep breathing  Ineffective treatments:  None tried  Associated symptoms: cough and fatigue    Associated symptoms: no abdominal pain, no AICD problem, no altered mental status, no anorexia, no anxiety, no back pain, no diaphoresis, no dizziness, no dysphagia, no fever, no headache, no heartburn, no nausea, no numbness, no shortness of breath, no syncope, no vomiting and no weakness    Cough  Associated symptoms: chest pain    Associated symptoms: no chills, no diaphoresis, no ear pain, no eye discharge, no fever, no headaches, no myalgias, no rash, no shortness of breath and no sore throat        Review of Systems   Constitutional: Positive for fatigue. Negative for activity change, appetite change, chills, diaphoresis and fever.   HENT: Negative for congestion, drooling, ear pain, facial swelling, hearing loss, sinus pressure, sore throat and trouble swallowing.    Eyes: Negative.  Negative for discharge.   Respiratory: Positive for cough. Negative for shortness of breath.    Cardiovascular: Positive for chest pain. Negative for syncope.   Gastrointestinal: Negative for abdominal distention, abdominal pain, anorexia, blood in stool, diarrhea, heartburn, nausea and vomiting.    Endocrine: Negative.  Negative for cold intolerance, heat intolerance, polydipsia, polyphagia and polyuria.   Genitourinary: Negative.  Negative for dysuria, flank pain and urgency.   Musculoskeletal: Negative.  Negative for arthralgias, back pain, myalgias and neck stiffness.   Skin: Negative.  Negative for color change, pallor and rash.   Allergic/Immunologic: Negative.    Neurological: Negative.  Negative for dizziness, seizures, speech difficulty, weakness, numbness and headaches.   Hematological: Negative.  Negative for adenopathy.   All other systems reviewed and are negative.      Past Medical History:   Diagnosis Date   • GERD (gastroesophageal reflux disease)    • Hypertension    • Shoulder dislocation    • TMJ (temporomandibular joint syndrome)        No Known Allergies    Past Surgical History:   Procedure Laterality Date   • ENDOSCOPY N/A 1/22/2019    Procedure: ESOPHAGOGASTRODUODENOSCOPY WITH ANESTHESIA;  Surgeon: Khari Ramirez DO;  Location: [facility] ENDOSCOPY;  Service: Gastroenterology   • HYSTERECTOMY         Family History   Problem Relation Age of Onset   • Colon cancer Neg Hx    • Colon polyps Neg Hx    • Esophageal cancer Neg Hx    • Breast cancer Neg Hx        Social History     Socioeconomic History   • Marital status:      Spouse name: Not on file   • Number of children: Not on file   • Years of education: Not on file   • Highest education level: Not on file   Tobacco Use   • Smoking status: Current Some Day Smoker     Packs/day: 0.25     Years: 5.00     Pack years: 1.25   • Smokeless tobacco: Never Used   Substance and Sexual Activity   • Alcohol use: No   • Drug use: No   Social History Narrative               Objective   Physical Exam  Vitals signs and nursing note reviewed. Exam conducted with a chaperone present.   Constitutional:       General: She is not in acute distress.     Appearance: Normal appearance. She is well-developed. She is not toxic-appearing or  diaphoretic.   HENT:      Head: Normocephalic and atraumatic.      Right Ear: External ear normal.      Nose: Nose normal.      Mouth/Throat:      Mouth: Mucous membranes are moist.   Eyes:      Conjunctiva/sclera: Conjunctivae normal.      Pupils: Pupils are equal, round, and reactive to light.   Neck:      Musculoskeletal: Normal range of motion and neck supple. No neck rigidity.   Cardiovascular:      Rate and Rhythm: Normal rate and regular rhythm.      Chest Wall: PMI is not displaced.      Pulses: Normal pulses. No decreased pulses.      Heart sounds: Normal heart sounds. No murmur.   Pulmonary:      Effort: Pulmonary effort is normal. No tachypnea, accessory muscle usage or respiratory distress.      Breath sounds: No stridor. Examination of the right-middle field reveals rhonchi. Examination of the left-middle field reveals rhonchi. Examination of the right-lower field reveals rhonchi. Examination of the left-lower field reveals rhonchi. Rhonchi present. No decreased breath sounds, wheezing or rales.   Chest:      Chest wall: Tenderness present. No crepitus.   Abdominal:      General: Bowel sounds are normal. There is no distension.      Palpations: Abdomen is soft.      Tenderness: There is no abdominal tenderness.   Musculoskeletal: Normal range of motion.         General: No swelling or tenderness.      Comments: Lower extremity exam bilaterally is unremarkable.  There is no right or left calf tenderness .  There is no palpable venous cord.  No obvious difference in the size of the legs.  No pitting edema.  The dorsalis pedis and posterior tibial femoral and popliteal pulses are palpable and +2 bilaterally.  Homans sign is negative   Skin:     General: Skin is warm.      Capillary Refill: Capillary refill takes less than 2 seconds.      Coloration: Skin is not jaundiced.      Findings: No erythema or rash.   Neurological:      General: No focal deficit present.      Mental Status: She is alert and  oriented to person, place, and time. Mental status is at baseline.      Cranial Nerves: No cranial nerve deficit.      Motor: No weakness.      Coordination: Coordination normal.      Deep Tendon Reflexes: Reflexes are normal and symmetric. Reflexes normal.   Psychiatric:         Mood and Affect: Mood normal.         Procedures           ED Course  ED Course as of Jan 09 1221   Sat Jan 09, 2021   0648 EKG showed normal sinus rhythm rate of 81 bpm  ms QRS duration 78 ms acute ischemia  PPE was utilized     [TS]   0937 Normal sinus rhythm    [TS]   1214 This patient came in with shortness of breath cough congestion hurting all over her Covid test was negative her oxygen saturations have run down to 8045% a couple of times I have given neb treatments and watched in the ER whenever she taken off the oxygen her sats dropped down to 88% especially with exertion they have gone down to 86% she does not appear toxic otherwise CT of the chest was negative for PE and a Covid swabs have been negative I have discussed this case with the hospitalist will admit her to the hospital    [TS]   1216 Cessation of this patient given the chest wall pain pleurisy CT of the chest is negative for any PE is a urine drug is negative her blood pressure has been on the elevated side which responded to labetalol she was given IV antibiotic steroids neb treatments in the ER her oxygen saturations on 2 L of oxygen have remained the range of 94 to 95%  The problem is when you take her off the oxygen her sats dropped down to 88 even lower if she exerts this probably is related to reactive airway disease more than anything else since her CT of the chest negative for pulmonary embolus there is no evidence of CHF she does have some cardiomegaly but no evidence of pericardial effusion and no evidence of cardiac tamponade on physical examination this patient may have undiagnosed pulmonary hypertension require further work-up with echo's and  pulmonary function testing    [TS]      ED Course User Index  [TS] Mark Silver MD                                         HEART Score (for prediction of 6-week risk of major adverse cardiac event) reviewed and/or performed as part of the patient evaluation and treatment planning process.  The result associated with this review/performance is: 0    PERC Rule (for pulmonary embolism) reviewed and/or performed as part of the patient evaluation and treatment planning process.  The result associated with this review/performance is: 0       MDM  Number of Diagnoses or Management Options  Diagnosis management comments: Patient with chest wall tenderness and cough congestion may have Covid or other vital infections he is wheezing at this time  Differential Diagnosis:  I considered chest wall pain, muscle strain, costochondritis, pleurisy, rib fracture, herpes zoster, cardiovascular etiology, myocardial infarction, intermediate coronary syndrome, unstable angina, angina, aortic dissection, pericarditis, pulmonary etiology, pulmonary embolism, pneumonia, pneumothorax, lung cancer, gastroesophageal reflux disease, esophagitis, esophageal spasm and gastrointestinal etiology as a possible cause of chest pain in this patient. This is a partial list of diagnoses considered.    Differential Diagnosis:  I considered pulmonary etiology, asthma, chronic obstructive pulmonary disease, pneumonia, pulmonary embolism, adult respiratory distress syndrome, pneumothorax, pleural effusion, pulmonary fibrosis, cardiac etiology, congestive heart failure, myocardial infarction, metabolic etiology, diabetic ketoacidosis, uremia, acidosis, sepsis, anemia, drug related etiology, hyperventilation and CNS disease as a possible cause of dyspnea in this patient. This is a partial list of diagnoses considered.            Amount and/or Complexity of Data Reviewed  Clinical lab tests: ordered and reviewed  Tests in the radiology section of CPT®:  ordered and reviewed  Tests in the medicine section of CPT®: reviewed and ordered    Risk of Complications, Morbidity, and/or Mortality  Presenting problems: low  Diagnostic procedures: low  Management options: low  General comments: Patient had cough wheezing chest wall pain will give neb treatment and steroids get lab work-up tested for Covid get used to the cardiac markers her heart score is 0 and her PERC score is 0        Final diagnoses:   Hypoxic   Reactive airway disease with acute exacerbation, unspecified asthma severity, unspecified whether persistent   Essential hypertension            Mark Silver MD  01/09/21 [id number]

## 2021-01-09 NOTE — PLAN OF CARE
Goal Outcome Evaluation:  Plan of Care Reviewed With: patient  Progress: no change  Outcome Summary: admit from ER with chest pain, HTN and hypoxia. no co pain. o2 at 2L per NC. echo ordered. cont to monitor.

## 2021-01-09 NOTE — H&P
[facility] Medicine Services  HISTORY AND PHYSICAL    Date of Admission: 1/9/2021  Primary Care Physician: Estevan Izaguirre APRN    Subjective     Chief Complaint: chest pain and body pain    History of Present Illness  Patient states that she has been having some chest pain and pain all over her body for the last 2 days.  She describes the chest pain as being located over on the right side and worse when she coughs.  The allover body pain is somewhat difficult for her to give a description but she just says she overall does not feel well and it hurts down even into her feet.  She denies having any fever.  She does states she maybe is felt a little bit short of breath and again is having a cough.  She has had no nausea vomiting or diarrhea.  She does smoke half a pack of cigarettes a day for over the past over 20 years.  She does not wear home oxygen.  In the ER the patient had a chest x-ray that was unremarkable.  She was noted to be hypoxic with ambulation to 86%.  CT a was unremarkable for PE or any evidence of pneumonia or other explanation for hypoxia at this time.  Patient did have elevated blood pressures of 200/100 and was treated with IV normodyme.  She improved somewhat with this treatment.  She states that she had previously been on medication however has not taken this for some time.  The only thing she takes at home is medicine for reflux.  Her troponins have been normal x2.  She has been placed on 2 L nasal cannula at this time and maintains her sats around 92%.  In the ER she was treated with an inhaler, steroids and a dose of antibiotics.        Review of Systems     Otherwise complete ROS reviewed and negative except as mentioned in the HPI.    Past Medical History:   Past Medical History:   Diagnosis Date   • GERD (gastroesophageal reflux disease)    • Hypertension    • Shoulder dislocation    • TMJ (temporomandibular joint syndrome)      Past Surgical  "History:  Past Surgical History:   Procedure Laterality Date   • ENDOSCOPY N/A 1/22/2019    Procedure: ESOPHAGOGASTRODUODENOSCOPY WITH ANESTHESIA;  Surgeon: Khari Ramirez DO;  Location: [facility] ENDOSCOPY;  Service: Gastroenterology   • HYSTERECTOMY       Social History:  reports that she has been smoking. She has a 2.50 pack-year smoking history. She has never used smokeless tobacco. She reports that she does not drink alcohol or use drugs.    Family History: Asked about mother and father family history of hypertension diabetes heart disease or cancer and patient denies both and states that she does not know of any illness in her mother or father       Allergies:  No Known Allergies  Medications:    Of these medicines listed below the only thing the patient was taking was the PPI and the Carafate    Prior to Admission medications    Medication Sig Start Date End Date Taking? Authorizing Provider   dicyclomine (BENTYL) 20 MG tablet Take 1 tablet by mouth Every 6 (Six) Hours As Needed (abdominal pain). 5/7/18   Wale Sanchez MD   ibuprofen (ADVIL,MOTRIN) 600 MG tablet Take 1 tablet by mouth Every 8 (Eight) Hours As Needed for Mild Pain (1-3). 6/15/17   Jennifer Mirza APRN   lisinopril (PRINIVIL,ZESTRIL) 30 MG tablet Take 30 mg by mouth Daily.    Yolette Banda MD   methylPREDNISolone (MEDROL, SHELBY,) 4 MG tablet Take as directed on package instructions. 12/15/19   Castleman, Danna D, PA   pantoprazole (PROTONIX) 40 MG EC tablet Take 40 mg by mouth Daily.    Yolette Banda MD   sucralfate (CARAFATE) 1 g tablet Take 1 g by mouth 4 (Four) Times a Day.    Yolette Banda MD     Objective     Vital Signs: /96   Pulse 86   Temp 97.9 °F (36.6 °C) (Oral)   Resp 22   Ht 167.6 cm (66\")   Wt 95.3 kg (210 lb)   SpO2 93%   BMI 33.89 kg/m²   Physical Exam  Vitals signs reviewed.   Constitutional:       General: She is not in acute distress.     Appearance: She is obese. She is not " ill-appearing, toxic-appearing or diaphoretic.      Comments: Patient speaks to me in a very mumbled tone.  She does answer my questions and they are appropriate however sometimes I have to ask her several times   HENT:      Head: Normocephalic and atraumatic.      Mouth/Throat:      Mouth: Mucous membranes are moist.      Pharynx: Oropharynx is clear.   Eyes:      Extraocular Movements: Extraocular movements intact.      Conjunctiva/sclera: Conjunctivae normal.      Pupils: Pupils are equal, round, and reactive to light.   Neck:      Musculoskeletal: Normal range of motion and neck supple. No muscular tenderness.   Cardiovascular:      Rate and Rhythm: Normal rate and regular rhythm.      Pulses: Normal pulses.      Heart sounds: No murmur.   Pulmonary:      Effort: Pulmonary effort is normal. No respiratory distress.      Breath sounds: Normal breath sounds. No wheezing or rhonchi.      Comments: Does not have any wheeze that I can appreciate.  She is not tachypneic and in no distress with no increased work of breathing  Abdominal:      General: Bowel sounds are normal. There is no distension.      Palpations: Abdomen is soft.      Tenderness: There is no abdominal tenderness.   Musculoskeletal: Normal range of motion.         General: No swelling or tenderness.   Skin:     General: Skin is warm and dry.      Findings: No erythema or rash.   Neurological:      General: No focal deficit present.      Mental Status: She is alert and oriented to person, place, and time.      Cranial Nerves: No cranial nerve deficit.      Motor: No weakness.   Psychiatric:         Mood and Affect: Mood normal.         Behavior: Behavior normal.              Results Reviewed:  Lab Results (last 24 hours)     Procedure Component Value Units Date/Time    Urine Drug Screen - Urine, Clean Catch [[id number]]  (Normal) Collected: 01/09/21 1105    Specimen: Urine, Clean Catch Updated: 01/09/21 1130     THC, Screen, Urine Negative      Phencyclidine (PCP), Urine Negative     Cocaine Screen, Urine Negative     Methamphetamine, Ur Negative     Opiate Screen Negative     Amphetamine Screen, Urine Negative     Benzodiazepine Screen, Urine Negative     Tricyclic Antidepressants Screen Negative     Methadone Screen, Urine Negative     Barbiturates Screen, Urine Negative     Oxycodone Screen, Urine Negative     Propoxyphene Screen Negative     Buprenorphine, Screen, Urine Negative    Narrative:      Cutoff For Drugs Screened:    Amphetamines               500 ng/ml  Barbiturates               200 ng/ml  Benzodiazepines            150 ng/ml  Cocaine                    150 ng/ml  Methadone                  200 ng/ml  Opiates                    100 ng/ml  Phencyclidine               25 ng/ml  THC                            50 ng/ml  Methamphetamine            500 ng/ml  Tricyclic Antidepressants  300 ng/ml  Oxycodone                  100 ng/ml  Propoxyphene               300 ng/ml  Buprenorphine               10 ng/ml    The normal value for all drugs tested is negative. This report includes unconfirmed screening results, with the cutoff values listed, to be used for medical treatment purposes only.  Unconfirmed results must not be used for non-medical purposes such as employment or legal testing.  Clinical consideration should be applied to any drug of abuse test, particularly when unconfirmed results are used.      Troponin [[id number]]  (Normal) Collected: 01/09/21 0915    Specimen: Blood Updated: 01/09/21 0939     Troponin T <0.010 ng/mL     Narrative:      Troponin T Reference Range:  <= 0.03 ng/mL-   Negative for AMI  >0.03 ng/mL-     Abnormal for myocardial necrosis.  Clinicians would have to utilize clinical acumen, EKG, Troponin and serial changes to determine if it is an Acute Myocardial Infarction or myocardial injury due to an underlying chronic condition.       Results may be falsely decreased if patient taking Biotin.      COVID PRE-OP /  PRE-PROCEDURE SCREENING ORDER (NO ISOLATION) - Swab, Nasal Cavity [[id number]]  (Normal) Collected: 01/09/21 0659    Specimen: Swab from Nasal Cavity Updated: 01/09/21 0843    Narrative:      The following orders were created for panel order COVID PRE-OP / PRE-PROCEDURE SCREENING ORDER (NO ISOLATION) - Swab, Nasal Cavity.  Procedure                               Abnormality         Status                     ---------                               -----------         ------                     COVID-19,[facility]...[[id number]]  Normal              Final result                 Please view results for these tests on the individual orders.    COVID-19,[facility] IN-HOUSE,Nasal Swab No Transport Media 3-4 HR TAT - Swab, Nasal Cavity [[id number]]  (Normal) Collected: 01/09/21 0659    Specimen: Swab from Nasal Cavity Updated: 01/09/21 0843     COVID19 Not Detected    Narrative:      Fact sheet for providers: https://www.fda.gov/media/950894/download     Fact sheet for patients: https://www.fda.gov/media/965526/download    Test performed by PCR.    Blood Gas, Arterial - [[id number]]  (Abnormal) Collected: 01/09/21 0800    Specimen: Arterial Blood Updated: 01/09/21 0809     Site Right Brachial     Rey's Test N/A     pH, Arterial 7.378 pH units      pCO2, Arterial 46.0 mm Hg      Comment: 83 Value above reference range        pO2, Arterial 63.9 mm Hg      Comment: 84 Value below reference range        HCO3, Arterial 27.1 mmol/L      Comment: 83 Value above reference range        Base Excess, Arterial 1.4 mmol/L      O2 Saturation, Arterial 93.6 %      Comment: 84 Value below reference range        Temperature 37.0 C      Barometric Pressure for Blood Gas 758 mmHg      Modality Nasal Cannula     Flow Rate 2.0 lpm      Ventilator Mode NA     Collected by [id number]     Comment: Meter: [id number]     :  [id number]        pCO2, Temperature Corrected 46.0 mm Hg      pH, Temp Corrected 7.378 pH Units      pO2,  Temperature Corrected 63.9 mm Hg     Gray Draw [[id number]] Collected: 01/09/21 0635    Specimen: Blood Updated: 01/09/21 0745    Narrative:      The following orders were created for panel order Gray Draw.  Procedure                               Abnormality         Status                     ---------                               -----------         ------                     Light Blue Top[[id number]]                                   Final result               Green Top (Gel)[[id number]]                                  Final result               Lavender Top[[id number]]                                     Final result               Red Top[[id number]]                                          Final result                 Please view results for these tests on the individual orders.    Light Blue Top [[id number]] Collected: 01/09/21 0635    Specimen: Blood Updated: 01/09/21 0745     Extra Tube hold for add-on     Comment: Auto resulted       Green Top (Gel) [[id number]] Collected: 01/09/21 0635    Specimen: Blood Updated: 01/09/21 0745     Extra Tube Hold for add-ons.     Comment: Auto resulted.       Red Top [[id number]] Collected: 01/09/21 0635    Specimen: Blood Updated: 01/09/21 0745     Extra Tube Hold for add-ons.     Comment: Auto resulted.       Lavender Top [[id number]] Collected: 01/09/21 0635    Specimen: Blood Updated: 01/09/21 0745     Extra Tube hold for add-on     Comment: Auto resulted       CBC & Differential [[id number]]  (Abnormal) Collected: 01/09/21 0635    Specimen: Blood Updated: 01/09/21 0725    Narrative:      The following orders were created for panel order CBC & Differential.  Procedure                               Abnormality         Status                     ---------                               -----------         ------                     CBC Auto Differential[[id number]]        Abnormal            Final result                 Please view results for these tests on the individual  orders.    CBC Auto Differential [[id number]]  (Abnormal) Collected: 01/09/21 0635    Specimen: Blood Updated: 01/09/21 0725     WBC 13.60 10*3/mm3      RBC 4.54 10*6/mm3      Hemoglobin 13.2 g/dL      Hematocrit 40.8 %      MCV 89.9 fL      MCH 29.1 pg      MCHC 32.4 g/dL      RDW 12.9 %      RDW-SD 42.3 fl      MPV 10.5 fL      Platelets 329 10*3/mm3      Neutrophil % 74.5 %      Lymphocyte % 16.3 %      Monocyte % 7.4 %      Eosinophil % 0.8 %      Basophil % 0.7 %      Immature Grans % 0.3 %      Neutrophils, Absolute 10.12 10*3/mm3      Lymphocytes, Absolute 2.22 10*3/mm3      Monocytes, Absolute 1.01 10*3/mm3      Eosinophils, Absolute 0.11 10*3/mm3      Basophils, Absolute 0.10 10*3/mm3      Immature Grans, Absolute 0.04 10*3/mm3      nRBC 0.0 /100 WBC     hCG, Serum, Qualitative [[id number]]  (Normal) Collected: 01/09/21 0635    Specimen: Blood Updated: 01/09/21 0712     HCG Qualitative Negative    C-reactive Protein [[id number]]  (Abnormal) Collected: 01/09/21 0635    Specimen: Blood Updated: 01/09/21 0711     C-Reactive Protein 0.72 mg/dL     Comprehensive Metabolic Panel [[id number]]  (Abnormal) Collected: 01/09/21 0635    Specimen: Blood Updated: 01/09/21 0709     Glucose 131 mg/dL      BUN 12 mg/dL      Creatinine 0.71 mg/dL      Sodium 139 mmol/L      Potassium 4.0 mmol/L      Chloride 104 mmol/L      CO2 26.0 mmol/L      Calcium 8.9 mg/dL      Total Protein 7.6 g/dL      Albumin 4.20 g/dL      ALT (SGPT) 20 U/L      AST (SGOT) 14 U/L      Alkaline Phosphatase 118 U/L      Total Bilirubin 0.3 mg/dL      eGFR Non African Amer 91 mL/min/1.73      Globulin 3.4 gm/dL      A/G Ratio 1.2 g/dL      BUN/Creatinine Ratio 16.9     Anion Gap 9.0 mmol/L     Narrative:      GFR Normal >60  Chronic Kidney Disease <60  Kidney Failure <15      CK [[id number]]  (Normal) Collected: 01/09/21 0635    Specimen: Blood Updated: 01/09/21 0708     Creatine Kinase 95 U/L     Troponin [[id number]]  (Normal) Collected: 01/09/21 0635     Specimen: Blood Updated: 01/09/21 0706     Troponin T <0.010 ng/mL     Narrative:      Troponin T Reference Range:  <= 0.03 ng/mL-   Negative for AMI  >0.03 ng/mL-     Abnormal for myocardial necrosis.  Clinicians would have to utilize clinical acumen, EKG, Troponin and serial changes to determine if it is an Acute Myocardial Infarction or myocardial injury due to an underlying chronic condition.       Results may be falsely decreased if patient taking Biotin.      BNP [[id number]]  (Normal) Collected: 01/09/21 0635    Specimen: Blood Updated: 01/09/21 0706     proBNP 39.4 pg/mL     Narrative:      Among patients with dyspnea, NT-proBNP is highly sensitive for the detection of acute congestive heart failure. In addition NT-proBNP of <300 pg/ml effectively rules out acute congestive heart failure with 99% negative predictive value.    Results may be falsely decreased if patient taking Biotin.      Protime-INR [[id number]]  (Normal) Collected: 01/09/21 0635    Specimen: Blood Updated: 01/09/21 0701     Protime 12.0 Seconds      INR 0.92    aPTT [[id number]]  (Normal) Collected: 01/09/21 0635    Specimen: Blood Updated: 01/09/21 0701     PTT 26.0 seconds     D-dimer, Quantitative [[id number]]  (Abnormal) Collected: 01/09/21 0635    Specimen: Blood Updated: 01/09/21 0701     D-Dimer, Quantitative 0.59 mg/L (FEU)     Narrative:      Reference Range is 0-0.50 mg/L FEU. However, results <0.50 mg/L FEU tends to rule out DVT or PE. Results >0.50 mg/L FEU are not useful in predicting absence or presence of DVT or PE.          Imaging Results (Last 24 Hours)     Procedure Component Value Units Date/Time    CT Angiogram Chest [[id number]] Collected: 01/09/21 1016     Updated: 01/09/21 1024    Narrative:      EXAMINATION: CT ANGIOGRAM CHEST- 1/9/2021 10:16 AM CST     HISTORY: Positive d-dimer, chest pain     COMPARISON: Chest x-ray 1/9/2021     DOSE: 599 mGy-cm     TECHNIQUE: Sequential imaging was performed from the thoracic  inlet  through the upper abdomen following the administration of IV contrast.   Sagittal and coronal reformations were made from the original source  data and reviewed. Additionally, 3-D MIPS reconstructions of the vessels  were made per CTA protocol. Automated exposure control was also utilized  to decrease patient radiation dose.     FINDINGS:   The visualized thyroid gland is grossly normal in appearance. The  trachea and main bronchi appear widely patent and in normal anatomic  position. The esophagus is grossly normal in appearance.     No pathologically enlarged axillary, mediastinal, or hilar lymph nodes  are identified.     The heart appears mildly enlarged. There is no pericardial or pleural  effusion. The ascending thoracic aorta and main pulmonary artery appear  normal in caliber. The pulmonary arteries are well opacified, and there  are no filling defects to suggest PE.     There is atelectasis in both lung bases. The lungs otherwise appear  clear.     Review of the visualized portion of the upper abdomen demonstrates no  acute abnormalities.     Review of the visualized osseous structures demonstrates no acute or  aggressive lesions.       Impression:      1. No evidence of PE or acute aortic pathology.     2. Mild cardiomegaly.  3. Dependent atelectasis in the lung bases.           This report was finalized on 01/09/2021 10:21 by Dr. Tera Saucedo MD.    XR Chest 1 View [[id number]] Collected: 01/09/21 0722     Updated: 01/09/21 0726    Narrative:      EXAMINATION: XR CHEST 1 VW- 1/9/2021 7:22 AM CST     HISTORY: Chest pain     COMPARISON: None     FINDINGS:  The heart and mediastinal contours appear normal. Lungs are clear  without focal consolidation or effusion. No pneumothorax is appreciated.  Pulmonary vasculature appears grossly normal.       Impression:      No evidence of acute cardiopulmonary process.  This report was finalized on 01/09/2021 07:23 by Dr. Tera Saucedo MD.        I have  personally reviewed and interpreted the radiology studies and ECG obtained at time of admission.     Assessment / Plan     Assessment:   Active Hospital Problems    Diagnosis   • Hypoxic   • Uncontrolled hypertension   • Chest pain, atypical         Plan:    1. Check 2 d echo  2. Restart lisinopril for her and add prn agent  3. Continue nasal cannula for now and wean as tolerates  4. Oral steroids, nebs, azithro, mucinex for possible upper respiratory infection      Code Status: full     I discussed the patient's findings and my recommendations with the patient    Estimated length of stay 1 day    Patient seen and examined by me on 1/9/21 at 1300.    Electronically signed by Cary Paul MD, 01/09/21, 13:00 CST.

## 2021-01-10 ENCOUNTER — APPOINTMENT (OUTPATIENT)
Dept: CARDIOLOGY | Facility: HOSPITAL | Age: 41
End: 2021-01-10

## 2021-01-10 VITALS
SYSTOLIC BLOOD PRESSURE: 111 MMHG | OXYGEN SATURATION: 96 % | BODY MASS INDEX: 37.2 KG/M2 | RESPIRATION RATE: 20 BRPM | DIASTOLIC BLOOD PRESSURE: 86 MMHG | WEIGHT: 231.48 LBS | HEART RATE: 87 BPM | HEIGHT: 66 IN | TEMPERATURE: 98 F

## 2021-01-10 PROBLEM — Z72.0 TOBACCO ABUSE: Status: ACTIVE | Noted: 2021-01-10

## 2021-01-10 LAB
ANION GAP SERPL CALCULATED.3IONS-SCNC: 9 MMOL/L (ref 5–15)
BASOPHILS # BLD AUTO: 0.03 10*3/MM3 (ref 0–0.2)
BASOPHILS NFR BLD AUTO: 0.2 % (ref 0–1.5)
BH CV ECHO MEAS - AO MAX PG (FULL): 3.3 MMHG
BH CV ECHO MEAS - AO MAX PG: 9 MMHG
BH CV ECHO MEAS - AO MEAN PG (FULL): 2 MMHG
BH CV ECHO MEAS - AO MEAN PG: 5 MMHG
BH CV ECHO MEAS - AO ROOT AREA (BSA CORRECTED): 1.5
BH CV ECHO MEAS - AO ROOT AREA: 8 CM^2
BH CV ECHO MEAS - AO ROOT DIAM: 3.2 CM
BH CV ECHO MEAS - AO V2 MAX: 150 CM/SEC
BH CV ECHO MEAS - AO V2 MEAN: 110 CM/SEC
BH CV ECHO MEAS - AO V2 VTI: 33 CM
BH CV ECHO MEAS - AVA(I,A): 2.3 CM^2
BH CV ECHO MEAS - AVA(I,D): 2.3 CM^2
BH CV ECHO MEAS - AVA(V,A): 2.5 CM^2
BH CV ECHO MEAS - AVA(V,D): 2.5 CM^2
BH CV ECHO MEAS - BSA(HAYCOCK): 2.3 M^2
BH CV ECHO MEAS - BSA: 2.1 M^2
BH CV ECHO MEAS - BZI_BMI: 37.4 KILOGRAMS/M^2
BH CV ECHO MEAS - BZI_METRIC_HEIGHT: 167.6 CM
BH CV ECHO MEAS - BZI_METRIC_WEIGHT: 105.2 KG
BH CV ECHO MEAS - EDV(CUBED): 133.4 ML
BH CV ECHO MEAS - EDV(MOD-SP4): 118 ML
BH CV ECHO MEAS - EDV(TEICH): 124.4 ML
BH CV ECHO MEAS - EF(CUBED): 83.4 %
BH CV ECHO MEAS - EF(MOD-SP4): 68.4 %
BH CV ECHO MEAS - EF(TEICH): 76 %
BH CV ECHO MEAS - ESV(CUBED): 22.2 ML
BH CV ECHO MEAS - ESV(MOD-SP4): 37.3 ML
BH CV ECHO MEAS - ESV(TEICH): 29.8 ML
BH CV ECHO MEAS - FS: 45 %
BH CV ECHO MEAS - IVS/LVPW: 1.3
BH CV ECHO MEAS - IVSD: 0.96 CM
BH CV ECHO MEAS - LA DIMENSION: 3.8 CM
BH CV ECHO MEAS - LA/AO: 1.2
BH CV ECHO MEAS - LAT PEAK E' VEL: 12 CM/SEC
BH CV ECHO MEAS - LV DIASTOLIC VOL/BSA (35-75): 55.4 ML/M^2
BH CV ECHO MEAS - LV MASS(C)D: 154.1 GRAMS
BH CV ECHO MEAS - LV MASS(C)DI: 72.3 GRAMS/M^2
BH CV ECHO MEAS - LV MAX PG: 5.7 MMHG
BH CV ECHO MEAS - LV MEAN PG: 3 MMHG
BH CV ECHO MEAS - LV SYSTOLIC VOL/BSA (12-30): 17.5 ML/M^2
BH CV ECHO MEAS - LV V1 MAX: 119 CM/SEC
BH CV ECHO MEAS - LV V1 MEAN: 83.5 CM/SEC
BH CV ECHO MEAS - LV V1 VTI: 24.4 CM
BH CV ECHO MEAS - LVIDD: 5.1 CM
BH CV ECHO MEAS - LVIDS: 2.8 CM
BH CV ECHO MEAS - LVLD AP4: 10.2 CM
BH CV ECHO MEAS - LVLS AP4: 8.7 CM
BH CV ECHO MEAS - LVOT AREA (M): 3.1 CM^2
BH CV ECHO MEAS - LVOT AREA: 3.1 CM^2
BH CV ECHO MEAS - LVOT DIAM: 2 CM
BH CV ECHO MEAS - LVPWD: 0.76 CM
BH CV ECHO MEAS - MED PEAK E' VEL: 9.68 CM/SEC
BH CV ECHO MEAS - MV A MAX VEL: 91.7 CM/SEC
BH CV ECHO MEAS - MV DEC SLOPE: 489 CM/SEC^2
BH CV ECHO MEAS - MV DEC TIME: 0.21 SEC
BH CV ECHO MEAS - MV E MAX VEL: 103 CM/SEC
BH CV ECHO MEAS - MV E/A: 1.1
BH CV ECHO MEAS - RAP SYSTOLE: 5 MMHG
BH CV ECHO MEAS - RVSP: 14 MMHG
BH CV ECHO MEAS - SI(AO): 124.6 ML/M^2
BH CV ECHO MEAS - SI(CUBED): 52.2 ML/M^2
BH CV ECHO MEAS - SI(LVOT): 36 ML/M^2
BH CV ECHO MEAS - SI(MOD-SP4): 37.9 ML/M^2
BH CV ECHO MEAS - SI(TEICH): 44.4 ML/M^2
BH CV ECHO MEAS - SV(AO): 265.4 ML
BH CV ECHO MEAS - SV(CUBED): 111.2 ML
BH CV ECHO MEAS - SV(LVOT): 76.7 ML
BH CV ECHO MEAS - SV(MOD-SP4): 80.7 ML
BH CV ECHO MEAS - SV(TEICH): 94.6 ML
BH CV ECHO MEAS - TR MAX VEL: 150 CM/SEC
BH CV ECHO MEASUREMENTS AVERAGE E/E' RATIO: 9.5
BUN SERPL-MCNC: 10 MG/DL (ref 6–20)
BUN/CREAT SERPL: 14.1 (ref 7–25)
CALCIUM SPEC-SCNC: 9 MG/DL (ref 8.6–10.5)
CHLORIDE SERPL-SCNC: 105 MMOL/L (ref 98–107)
CO2 SERPL-SCNC: 27 MMOL/L (ref 22–29)
CREAT SERPL-MCNC: 0.71 MG/DL (ref 0.57–1)
DEPRECATED RDW RBC AUTO: 44 FL (ref 37–54)
EOSINOPHIL # BLD AUTO: 0 10*3/MM3 (ref 0–0.4)
EOSINOPHIL NFR BLD AUTO: 0 % (ref 0.3–6.2)
ERYTHROCYTE [DISTWIDTH] IN BLOOD BY AUTOMATED COUNT: 13.1 % (ref 12.3–15.4)
GFR SERPL CREATININE-BSD FRML MDRD: 91 ML/MIN/1.73
GLUCOSE SERPL-MCNC: 191 MG/DL (ref 65–99)
HBA1C MFR BLD: 5.6 % (ref 4.8–5.6)
HCT VFR BLD AUTO: 37.8 % (ref 34–46.6)
HGB BLD-MCNC: 12 G/DL (ref 12–15.9)
IMM GRANULOCYTES # BLD AUTO: 0.14 10*3/MM3 (ref 0–0.05)
IMM GRANULOCYTES NFR BLD AUTO: 0.8 % (ref 0–0.5)
LEFT ATRIUM VOLUME INDEX: 25.7 ML/M2
LEFT ATRIUM VOLUME: 54.7 CM3
LYMPHOCYTES # BLD AUTO: 1.26 10*3/MM3 (ref 0.7–3.1)
LYMPHOCYTES NFR BLD AUTO: 6.8 % (ref 19.6–45.3)
MCH RBC QN AUTO: 28.9 PG (ref 26.6–33)
MCHC RBC AUTO-ENTMCNC: 31.7 G/DL (ref 31.5–35.7)
MCV RBC AUTO: 91.1 FL (ref 79–97)
MONOCYTES # BLD AUTO: 0.81 10*3/MM3 (ref 0.1–0.9)
MONOCYTES NFR BLD AUTO: 4.4 % (ref 5–12)
NEUTROPHILS NFR BLD AUTO: 16.22 10*3/MM3 (ref 1.7–7)
NEUTROPHILS NFR BLD AUTO: 87.8 % (ref 42.7–76)
NRBC BLD AUTO-RTO: 0 /100 WBC (ref 0–0.2)
PLATELET # BLD AUTO: 295 10*3/MM3 (ref 140–450)
PMV BLD AUTO: 10.5 FL (ref 6–12)
POTASSIUM SERPL-SCNC: 4.3 MMOL/L (ref 3.5–5.2)
QT INTERVAL: 348 MS
QT INTERVAL: 354 MS
QTC INTERVAL: 411 MS
QTC INTERVAL: 411 MS
RBC # BLD AUTO: 4.15 10*6/MM3 (ref 3.77–5.28)
SODIUM SERPL-SCNC: 141 MMOL/L (ref 136–145)
WBC # BLD AUTO: 18.46 10*3/MM3 (ref 3.4–10.8)

## 2021-01-10 PROCEDURE — 94799 UNLISTED PULMONARY SVC/PX: CPT

## 2021-01-10 PROCEDURE — 25010000002 PERFLUTREN 6.52 MG/ML SUSPENSION: Performed by: FAMILY MEDICINE

## 2021-01-10 PROCEDURE — 63710000001 PREDNISONE PER 1 MG: Performed by: FAMILY MEDICINE

## 2021-01-10 PROCEDURE — 80048 BASIC METABOLIC PNL TOTAL CA: CPT | Performed by: FAMILY MEDICINE

## 2021-01-10 PROCEDURE — 93306 TTE W/DOPPLER COMPLETE: CPT | Performed by: INTERNAL MEDICINE

## 2021-01-10 PROCEDURE — 93306 TTE W/DOPPLER COMPLETE: CPT

## 2021-01-10 PROCEDURE — 83036 HEMOGLOBIN GLYCOSYLATED A1C: CPT | Performed by: FAMILY MEDICINE

## 2021-01-10 PROCEDURE — 85025 COMPLETE CBC W/AUTO DIFF WBC: CPT | Performed by: FAMILY MEDICINE

## 2021-01-10 RX ORDER — GUAIFENESIN 600 MG/1
1200 TABLET, EXTENDED RELEASE ORAL EVERY 12 HOURS SCHEDULED
Qty: 28 TABLET | Refills: 0 | Status: SHIPPED | OUTPATIENT
Start: 2021-01-10 | End: 2022-08-23

## 2021-01-10 RX ORDER — AZITHROMYCIN 250 MG/1
TABLET, FILM COATED ORAL
Qty: 3 TABLET | Refills: 0 | Status: SHIPPED | OUTPATIENT
Start: 2021-01-11 | End: 2022-08-23

## 2021-01-10 RX ORDER — LISINOPRIL 20 MG/1
20 TABLET ORAL
Qty: 30 TABLET | Refills: 2 | OUTPATIENT
Start: 2021-01-11 | End: 2022-08-23

## 2021-01-10 RX ORDER — ALBUTEROL SULFATE 90 UG/1
2 AEROSOL, METERED RESPIRATORY (INHALATION) EVERY 4 HOURS PRN
Qty: 8 G | Refills: 5 | Status: SHIPPED | OUTPATIENT
Start: 2021-01-10 | End: 2022-08-23

## 2021-01-10 RX ORDER — PREDNISONE 10 MG/1
10 TABLET ORAL DAILY
Qty: 20 TABLET | Refills: 0 | Status: SHIPPED | OUTPATIENT
Start: 2021-01-10 | End: 2022-08-23

## 2021-01-10 RX ADMIN — SUCRALFATE 1 G: 1 TABLET ORAL at 11:36

## 2021-01-10 RX ADMIN — PREDNISONE 40 MG: 20 TABLET ORAL at 08:29

## 2021-01-10 RX ADMIN — SUCRALFATE 1 G: 1 TABLET ORAL at 08:29

## 2021-01-10 RX ADMIN — PREDNISONE 40 MG: 20 TABLET ORAL at 11:36

## 2021-01-10 RX ADMIN — PERFLUTREN 8.48 MG: 6.52 INJECTION, SUSPENSION INTRAVENOUS at 11:12

## 2021-01-10 RX ADMIN — GUAIFENESIN 1200 MG: 600 TABLET, EXTENDED RELEASE ORAL at 08:29

## 2021-01-10 RX ADMIN — PANTOPRAZOLE SODIUM 40 MG: 40 TABLET, DELAYED RELEASE ORAL at 08:29

## 2021-01-10 RX ADMIN — LISINOPRIL 20 MG: 20 TABLET ORAL at 08:29

## 2021-01-10 RX ADMIN — AZITHROMYCIN 250 MG: 250 TABLET, FILM COATED ORAL at 08:29

## 2021-01-10 RX ADMIN — SODIUM CHLORIDE, PRESERVATIVE FREE 10 ML: 5 INJECTION INTRAVENOUS at 08:31

## 2021-01-10 RX ADMIN — IPRATROPIUM BROMIDE AND ALBUTEROL SULFATE 3 ML: 2.5; .5 SOLUTION RESPIRATORY (INHALATION) at 07:09

## 2021-01-10 NOTE — PLAN OF CARE
Goal Outcome Evaluation:  Plan of Care Reviewed With: patient  Progress: no change  Outcome Summary: No c/o chest pain voiced.  No falls noted.  Echo later today scheduled.  Continue to monitor.  Problem: Hypertension Acute  Goal: Blood Pressure Within Desired Range  Outcome: Unable to Meet, Plan Revised     Problem: Adult Inpatient Plan of Care  Goal: Plan of Care Review  1/10/2021 0410 by Josie Rivera RN  Outcome: Unable to Meet, Plan Revised  Goal: Patient-Specific Goal (Individualized)  Outcome: Unable to Meet, Plan Revised  Goal: Absence of Hospital-Acquired Illness or Injury  Outcome: Unable to Meet, Plan Revised  Goal: Optimal Comfort and Wellbeing  Outcome: Unable to Meet, Plan Revised  Goal: Readiness for Transition of Care  Outcome: Unable to Meet, Plan Revised     Problem: Breathing Pattern Ineffective  Goal: Effective Breathing Pattern  Outcome: Unable to Meet, Plan Revised

## 2021-01-10 NOTE — DISCHARGE SUMMARY
[facility] Medicine Services  DISCHARGE SUMMARY       Date of Admission: 1/9/2021  Date of Discharge:  1/10/2021  Primary Care Physician: Estevan Izaguirre APRN    Presenting Problem/History of Present Illness:  Hypoxic [R09.02]     Final Discharge Diagnoses:  Active Hospital Problems    Diagnosis   • Tobacco abuse   • Hypoxic   • Uncontrolled hypertension   • Chest pain, atypical       Consults: none    Procedures Performed: none    Pertinent Test Results:   Lab Results (last 48 hours)     Procedure Component Value Units Date/Time    Hemoglobin A1c [[id number]]  (Normal) Collected: 01/10/21 0342    Specimen: Blood Updated: 01/10/21 0425     Hemoglobin A1C 5.60 %     Narrative:      Hemoglobin A1C Ranges:    Increased Risk for Diabetes  5.7% to 6.4%  Diabetes                     >= 6.5%  Diabetic Goal                < 7.0%    Basic Metabolic Panel [[id number]]  (Abnormal) Collected: 01/10/21 0342    Specimen: Blood Updated: 01/10/21 0415     Glucose 191 mg/dL      BUN 10 mg/dL      Creatinine 0.71 mg/dL      Sodium 141 mmol/L      Potassium 4.3 mmol/L      Chloride 105 mmol/L      CO2 27.0 mmol/L      Calcium 9.0 mg/dL      eGFR Non African Amer 91 mL/min/1.73      BUN/Creatinine Ratio 14.1     Anion Gap 9.0 mmol/L     Narrative:      GFR Normal >60  Chronic Kidney Disease <60  Kidney Failure <15      CBC Auto Differential [[id number]]  (Abnormal) Collected: 01/10/21 0342    Specimen: Blood Updated: 01/10/21 0352     WBC 18.46 10*3/mm3      RBC 4.15 10*6/mm3      Hemoglobin 12.0 g/dL      Hematocrit 37.8 %      MCV 91.1 fL      MCH 28.9 pg      MCHC 31.7 g/dL      RDW 13.1 %      RDW-SD 44.0 fl      MPV 10.5 fL      Platelets 295 10*3/mm3      Neutrophil % 87.8 %      Lymphocyte % 6.8 %      Monocyte % 4.4 %      Eosinophil % 0.0 %      Basophil % 0.2 %      Immature Grans % 0.8 %      Neutrophils, Absolute 16.22 10*3/mm3      Lymphocytes, Absolute 1.26 10*3/mm3      Monocytes,  Absolute 0.81 10*3/mm3      Eosinophils, Absolute 0.00 10*3/mm3      Basophils, Absolute 0.03 10*3/mm3      Immature Grans, Absolute 0.14 10*3/mm3      nRBC 0.0 /100 WBC     Troponin [[id number]]  (Normal) Collected: 01/09/21 1539    Specimen: Blood Updated: 01/09/21 1629     Troponin T <0.010 ng/mL     Narrative:      Troponin T Reference Range:  <= 0.03 ng/mL-   Negative for AMI  >0.03 ng/mL-     Abnormal for myocardial necrosis.  Clinicians would have to utilize clinical acumen, EKG, Troponin and serial changes to determine if it is an Acute Myocardial Infarction or myocardial injury due to an underlying chronic condition.       Results may be falsely decreased if patient taking Biotin.      Urine Drug Screen - Urine, Clean Catch [[id number]]  (Normal) Collected: 01/09/21 1105    Specimen: Urine, Clean Catch Updated: 01/09/21 1130     THC, Screen, Urine Negative     Phencyclidine (PCP), Urine Negative     Cocaine Screen, Urine Negative     Methamphetamine, Ur Negative     Opiate Screen Negative     Amphetamine Screen, Urine Negative     Benzodiazepine Screen, Urine Negative     Tricyclic Antidepressants Screen Negative     Methadone Screen, Urine Negative     Barbiturates Screen, Urine Negative     Oxycodone Screen, Urine Negative     Propoxyphene Screen Negative     Buprenorphine, Screen, Urine Negative    Narrative:      Cutoff For Drugs Screened:    Amphetamines               500 ng/ml  Barbiturates               200 ng/ml  Benzodiazepines            150 ng/ml  Cocaine                    150 ng/ml  Methadone                  200 ng/ml  Opiates                    100 ng/ml  Phencyclidine               25 ng/ml  THC                            50 ng/ml  Methamphetamine            500 ng/ml  Tricyclic Antidepressants  300 ng/ml  Oxycodone                  100 ng/ml  Propoxyphene               300 ng/ml  Buprenorphine               10 ng/ml    The normal value for all drugs tested is negative. This report includes  unconfirmed screening results, with the cutoff values listed, to be used for medical treatment purposes only.  Unconfirmed results must not be used for non-medical purposes such as employment or legal testing.  Clinical consideration should be applied to any drug of abuse test, particularly when unconfirmed results are used.      Troponin [[id number]]  (Normal) Collected: 01/09/21 0915    Specimen: Blood Updated: 01/09/21 0939     Troponin T <0.010 ng/mL     Narrative:      Troponin T Reference Range:  <= 0.03 ng/mL-   Negative for AMI  >0.03 ng/mL-     Abnormal for myocardial necrosis.  Clinicians would have to utilize clinical acumen, EKG, Troponin and serial changes to determine if it is an Acute Myocardial Infarction or myocardial injury due to an underlying chronic condition.       Results may be falsely decreased if patient taking Biotin.      COVID PRE-OP / PRE-PROCEDURE SCREENING ORDER (NO ISOLATION) - Swab, Nasal Cavity [[id number]]  (Normal) Collected: 01/09/21 0659    Specimen: Swab from Nasal Cavity Updated: 01/09/21 0843    Narrative:      The following orders were created for panel order COVID PRE-OP / PRE-PROCEDURE SCREENING ORDER (NO ISOLATION) - Swab, Nasal Cavity.  Procedure                               Abnormality         Status                     ---------                               -----------         ------                     COVID-19,[facility]...[[id number]]  Normal              Final result                 Please view results for these tests on the individual orders.    COVID-19,[facility] IN-HOUSE,Nasal Swab No Transport Media 3-4 HR TAT - Swab, Nasal Cavity [[id number]]  (Normal) Collected: 01/09/21 0659    Specimen: Swab from Nasal Cavity Updated: 01/09/21 0843     COVID19 Not Detected    Narrative:      Fact sheet for providers: https://www.fda.gov/media/201196/download     Fact sheet for patients: https://www.fda.gov/media/755040/download    Test performed by PCR.    Blood Gas,  Arterial - [[id number]]  (Abnormal) Collected: 01/09/21 0800    Specimen: Arterial Blood Updated: 01/09/21 0809     Site Right Brachial     Rey's Test N/A     pH, Arterial 7.378 pH units      pCO2, Arterial 46.0 mm Hg      Comment: 83 Value above reference range        pO2, Arterial 63.9 mm Hg      Comment: 84 Value below reference range        HCO3, Arterial 27.1 mmol/L      Comment: 83 Value above reference range        Base Excess, Arterial 1.4 mmol/L      O2 Saturation, Arterial 93.6 %      Comment: 84 Value below reference range        Temperature 37.0 C      Barometric Pressure for Blood Gas 758 mmHg      Modality Nasal Cannula     Flow Rate 2.0 lpm      Ventilator Mode NA     Collected by [id number]     Comment: Meter: [id number]     :  [id number]        pCO2, Temperature Corrected 46.0 mm Hg      pH, Temp Corrected 7.378 pH Units      pO2, Temperature Corrected 63.9 mm Hg     [place] Draw [[id number]] Collected: 01/09/21 0635    Specimen: Blood Updated: 01/09/21 0745    Narrative:      The following orders were created for panel order [place] Draw.  Procedure                               Abnormality         Status                     ---------                               -----------         ------                     Light Blue Top[[id number]]                                   Final result               Green Top (Gel)[[id number]]                                  Final result               Lavender Top[[id number]]                                     Final result               Red Top[[id number]]                                          Final result                 Please view results for these tests on the individual orders.    Light Blue Top [[id number]] Collected: 01/09/21 0635    Specimen: Blood Updated: 01/09/21 0745     Extra Tube hold for add-on     Comment: Auto resulted       Green Top (Gel) [[id number]] Collected: 01/09/21 0635    Specimen: Blood Updated: 01/09/21 0745     Extra Tube Hold for  add-ons.     Comment: Auto resulted.       Red Top [[id number]] Collected: 01/09/21 0635    Specimen: Blood Updated: 01/09/21 0745     Extra Tube Hold for add-ons.     Comment: Auto resulted.       Lavender Top [[id number]] Collected: 01/09/21 0635    Specimen: Blood Updated: 01/09/21 0745     Extra Tube hold for add-on     Comment: Auto resulted       CBC & Differential [[id number]]  (Abnormal) Collected: 01/09/21 0635    Specimen: Blood Updated: 01/09/21 0725    Narrative:      The following orders were created for panel order CBC & Differential.  Procedure                               Abnormality         Status                     ---------                               -----------         ------                     CBC Auto Differential[[id number]]        Abnormal            Final result                 Please view results for these tests on the individual orders.    CBC Auto Differential [[id number]]  (Abnormal) Collected: 01/09/21 0635    Specimen: Blood Updated: 01/09/21 0725     WBC 13.60 10*3/mm3      RBC 4.54 10*6/mm3      Hemoglobin 13.2 g/dL      Hematocrit 40.8 %      MCV 89.9 fL      MCH 29.1 pg      MCHC 32.4 g/dL      RDW 12.9 %      RDW-SD 42.3 fl      MPV 10.5 fL      Platelets 329 10*3/mm3      Neutrophil % 74.5 %      Lymphocyte % 16.3 %      Monocyte % 7.4 %      Eosinophil % 0.8 %      Basophil % 0.7 %      Immature Grans % 0.3 %      Neutrophils, Absolute 10.12 10*3/mm3      Lymphocytes, Absolute 2.22 10*3/mm3      Monocytes, Absolute 1.01 10*3/mm3      Eosinophils, Absolute 0.11 10*3/mm3      Basophils, Absolute 0.10 10*3/mm3      Immature Grans, Absolute 0.04 10*3/mm3      nRBC 0.0 /100 WBC     hCG, Serum, Qualitative [[id number]]  (Normal) Collected: 01/09/21 0635    Specimen: Blood Updated: 01/09/21 0712     HCG Qualitative Negative    C-reactive Protein [[id number]]  (Abnormal) Collected: 01/09/21 0635    Specimen: Blood Updated: 01/09/21 0711     C-Reactive Protein 0.72 mg/dL     Comprehensive  Metabolic Panel [[id number]]  (Abnormal) Collected: 01/09/21 0635    Specimen: Blood Updated: 01/09/21 0709     Glucose 131 mg/dL      BUN 12 mg/dL      Creatinine 0.71 mg/dL      Sodium 139 mmol/L      Potassium 4.0 mmol/L      Chloride 104 mmol/L      CO2 26.0 mmol/L      Calcium 8.9 mg/dL      Total Protein 7.6 g/dL      Albumin 4.20 g/dL      ALT (SGPT) 20 U/L      AST (SGOT) 14 U/L      Alkaline Phosphatase 118 U/L      Total Bilirubin 0.3 mg/dL      eGFR Non African Amer 91 mL/min/1.73      Globulin 3.4 gm/dL      A/G Ratio 1.2 g/dL      BUN/Creatinine Ratio 16.9     Anion Gap 9.0 mmol/L     Narrative:      GFR Normal >60  Chronic Kidney Disease <60  Kidney Failure <15      CK [[id number]]  (Normal) Collected: 01/09/21 0635    Specimen: Blood Updated: 01/09/21 0708     Creatine Kinase 95 U/L     Troponin [[id number]]  (Normal) Collected: 01/09/21 0635    Specimen: Blood Updated: 01/09/21 0706     Troponin T <0.010 ng/mL     Narrative:      Troponin T Reference Range:  <= 0.03 ng/mL-   Negative for AMI  >0.03 ng/mL-     Abnormal for myocardial necrosis.  Clinicians would have to utilize clinical acumen, EKG, Troponin and serial changes to determine if it is an Acute Myocardial Infarction or myocardial injury due to an underlying chronic condition.       Results may be falsely decreased if patient taking Biotin.      BNP [[id number]]  (Normal) Collected: 01/09/21 0635    Specimen: Blood Updated: 01/09/21 0706     proBNP 39.4 pg/mL     Narrative:      Among patients with dyspnea, NT-proBNP is highly sensitive for the detection of acute congestive heart failure. In addition NT-proBNP of <300 pg/ml effectively rules out acute congestive heart failure with 99% negative predictive value.    Results may be falsely decreased if patient taking Biotin.      Protime-INR [[id number]]  (Normal) Collected: 01/09/21 0635    Specimen: Blood Updated: 01/09/21 0701     Protime 12.0 Seconds      INR 0.92    aPTT [[id number]]  (Normal)  Collected: 01/09/21 0635    Specimen: Blood Updated: 01/09/21 0701     PTT 26.0 seconds     D-dimer, Quantitative [[id number]]  (Abnormal) Collected: 01/09/21 0635    Specimen: Blood Updated: 01/09/21 0701     D-Dimer, Quantitative 0.59 mg/L (FEU)     Narrative:      Reference Range is 0-0.50 mg/L FEU. However, results <0.50 mg/L FEU tends to rule out DVT or PE. Results >0.50 mg/L FEU are not useful in predicting absence or presence of DVT or PE.          Imaging Results (Last 48 Hours)     Procedure Component Value Units Date/Time    CT Angiogram Chest [[id number]] Collected: 01/09/21 1016     Updated: 01/09/21 1024    Narrative:      EXAMINATION: CT ANGIOGRAM CHEST- 1/9/2021 10:16 AM CST     HISTORY: Positive d-dimer, chest pain     COMPARISON: Chest x-ray 1/9/2021     DOSE: 599 mGy-cm     TECHNIQUE: Sequential imaging was performed from the thoracic inlet  through the upper abdomen following the administration of IV contrast.   Sagittal and coronal reformations were made from the original source  data and reviewed. Additionally, 3-D MIPS reconstructions of the vessels  were made per CTA protocol. Automated exposure control was also utilized  to decrease patient radiation dose.     FINDINGS:   The visualized thyroid gland is grossly normal in appearance. The  trachea and main bronchi appear widely patent and in normal anatomic  position. The esophagus is grossly normal in appearance.     No pathologically enlarged axillary, mediastinal, or hilar lymph nodes  are identified.     The heart appears mildly enlarged. There is no pericardial or pleural  effusion. The ascending thoracic aorta and main pulmonary artery appear  normal in caliber. The pulmonary arteries are well opacified, and there  are no filling defects to suggest PE.     There is atelectasis in both lung bases. The lungs otherwise appear  clear.     Review of the visualized portion of the upper abdomen demonstrates no  acute abnormalities.     Review of  the visualized osseous structures demonstrates no acute or  aggressive lesions.       Impression:      1. No evidence of PE or acute aortic pathology.     2. Mild cardiomegaly.  3. Dependent atelectasis in the lung bases.           This report was finalized on 01/09/2021 10:21 by Dr. Tera Saucedo MD.    XR Chest 1 View [[id number]] Collected: 01/09/21 0722     Updated: 01/09/21 0726    Narrative:      EXAMINATION: XR CHEST 1 VW- 1/9/2021 7:22 AM CST     HISTORY: Chest pain     COMPARISON: None     FINDINGS:  The heart and mediastinal contours appear normal. Lungs are clear  without focal consolidation or effusion. No pneumothorax is appreciated.  Pulmonary vasculature appears grossly normal.       Impression:      No evidence of acute cardiopulmonary process.  This report was finalized on 01/09/2021 07:23 by Dr. Tera Saucedo MD.         CT chest  IMPRESSION:  1. No evidence of PE or acute aortic pathology.     2. Mild cardiomegaly.  3. Dependent atelectasis in the lung bases.    ECHO      Chief Complaint on Day of Discharge: feeling much better    History of Present Illness on Day of Discharge: Patient states she is feeling much better than on arrival.  She says the tightness and heaviness and discomfort present in her chest and throughout her body however resolved.  She states that she feels she can breathe better and is coughing less.    Hospital Course:  The patient is a 40 y.o. female who presented to [facility] with past history of GERD and hypertension who presented to the hospital with some fairly vague symptoms of some right-sided chest discomfort, shortness of breath cough and all over body pain.  She states she had felt bad for about 2 days however had no real other symptoms of fever or chills nausea or vomiting.  In the ER her work-up was fairly unremarkable with an unremarkable CT of her chest as well as unremarkable labs.  She did however have some mild hypoxia to 86% with ambulation  "therefore admission was requested.    Of note the patient has smoked cigarettes for about 20 years therefore I felt the most likely situation was that of AN acute bronchitis or mild COPD exacerbation although she does not carry a formal diagnosis of COPD.  I did treat her with oral steroids, duo nebs, Mucinex and antibiotics.  However I did also order an echocardiogram for further evaluation which is still pending at time of discharge.  Apparently the patient had some type of reaction to the contrast given of intense pain and anxiety.  The patient had significantly improved however with the treatment that had been given.  I walked her in the hallway on day of discharge and checked her oxygen level of which was 91% without supplemental oxygen.  She quickly increased to 95%.  I discussed with her that she should discontinue tobacco products and finish some oral antibiotics steroids Mucinex and an albuterol inhaler.    Condition on Discharge:  Improved, stable    Physical Exam on Discharge:  /86 (BP Location: Right arm, Patient Position: Lying)   Pulse 87   Temp 98 °F (36.7 °C) (Oral)   Resp 20   Ht 167.6 cm (65.98\")   Wt 105 kg (231 lb 7.7 oz)   SpO2 96%   BMI 37.38 kg/m²   Physical Exam  Vitals signs reviewed.   Constitutional:       General: She is not in acute distress.     Appearance: She is not toxic-appearing.      Comments: Walked pt in hallway without 02. sats on return were 91% and quickly reached 95%   HENT:      Head: Normocephalic and atraumatic.      Mouth/Throat:      Mouth: Mucous membranes are moist.      Pharynx: Oropharynx is clear.      Comments: Poor dentition  Eyes:      Extraocular Movements: Extraocular movements intact.      Conjunctiva/sclera: Conjunctivae normal.      Pupils: Pupils are equal, round, and reactive to light.   Neck:      Musculoskeletal: Normal range of motion and neck supple. No muscular tenderness.   Cardiovascular:      Rate and Rhythm: Normal rate and regular " rhythm.      Pulses: Normal pulses.      Heart sounds: No murmur.   Pulmonary:      Effort: Pulmonary effort is normal. No respiratory distress.      Breath sounds: Normal breath sounds. No wheezing or rhonchi.   Abdominal:      General: Bowel sounds are normal. There is no distension.      Palpations: Abdomen is soft.      Tenderness: There is no abdominal tenderness.   Musculoskeletal: Normal range of motion.         General: No swelling or tenderness.   Skin:     General: Skin is warm and dry.      Findings: No erythema or rash.   Neurological:      General: No focal deficit present.      Mental Status: She is alert and oriented to person, place, and time.      Cranial Nerves: No cranial nerve deficit.      Motor: No weakness.   Psychiatric:         Mood and Affect: Mood normal.         Behavior: Behavior normal.           Discharge Disposition:  Home or Self Care    Discharge Medications:     Discharge Medications      New Medications      Instructions Start Date   albuterol sulfate  (90 Base) MCG/ACT inhaler  Commonly known as: PROVENTIL HFA;VENTOLIN HFA;PROAIR HFA   2 puffs, Inhalation, Every 4 Hours PRN      azithromycin 250 MG tablet  Commonly known as: ZITHROMAX   Take one tab po daily   Start Date: January 11, 2021     guaiFENesin 600 MG 12 hr tablet  Commonly known as: MUCINEX   1,200 mg, Oral, Every 12 Hours Scheduled      predniSONE 10 MG tablet  Commonly known as: DELTASONE   10 mg, Oral, Daily, Take take 4 tabs daily for 2 days, 3 tabs for 2 days, 2 tabs for 2 days, 1 tab for 2 days         Changes to Medications      Instructions Start Date   lisinopril 20 MG tablet  Commonly known as: PRINIVIL,ZESTRIL  What changed:   · medication strength  · how much to take  · when to take this   20 mg, Oral, Every 24 Hours Scheduled   Start Date: January 11, 2021        Continue These Medications      Instructions Start Date   dicyclomine 20 MG tablet  Commonly known as: BENTYL   20 mg, Oral, Every 6 Hours  PRN      ibuprofen 600 MG tablet  Commonly known as: ADVIL,MOTRIN   600 mg, Oral, Every 8 Hours PRN      pantoprazole 40 MG EC tablet  Commonly known as: PROTONIX   40 mg, Oral, Daily      sucralfate 1 g tablet  Commonly known as: CARAFATE   1 g, Oral, 4 Times Daily         Stop These Medications    methylPREDNISolone 4 MG dose pack  Commonly known as: MEDROL            Discharge Diet: regular    Activity at Discharge: as tolerated    Discharge Care Plan/Instructions: quit smoking    Follow-up Appointments:   F/u with pcp as needed    Test Results Pending at Discharge: final read on echo    Electronically signed by Cary Paul MD, 01/10/21, 12:27 CST.    Time: 35 min

## 2021-01-11 NOTE — PAYOR COMM NOTE
"ADMIT INPT 1-9-21  UR  [phone]    PLEASE NOTE:  NOT A LATE NOTIFICATION DUE TO WEEKEND    Elaina Weberjairo Waters (40 y.o. Female)     Date of Birth Social Security Number Address Home Phone MRN    [id number]  [id number]     [facility] [address] [phone] [id number]    Hindu Marital Status          None        Admission Date Admission Type Admitting Provider Attending Provider Department, Room/Bed    1/9/21 Emergency Cary Paul MD  [facility] 4B, 434/1    Discharge Date Discharge Disposition Discharge Destination        1/10/2021 Home or Self Care Home             Attending Provider: (none)   Allergies: Definity [Perflutren Lipid Microsphere]    Isolation: None   Infection: None   Code Status: Prior    Ht: 167.6 cm (65.98\")   Wt: 105 kg (231 lb 7.7 oz)    Admission Cmt: None   Principal Problem: None                Active Insurance as of 1/9/2021     Primary Coverage     Payor Plan Insurance Group Employer/Plan Group    [employer] [place] [employer] [place]ICA[place]      Payor Plan Address Payor Plan Phone Number Payor Plan Fax Number Effective Dates    [address] [address] [phone]  11/20/2016 - None Entered    [facility] [id number]       Subscriber Name Subscriber Birth Date Member ID       ALEKSANDR WEBER 1980 [id number]                 Emergency Contacts      (Rel.) Home Phone Work Phone Mobile Phone    Vanesa Weber (Relative) -- -- [phone]    Carlitos Weber (Relative) [phone] -- [phone]               History & Physical      Cary Paul MD at 01/09/21 1246              [facility] Medicine Services  HISTORY AND PHYSICAL    Date of Admission: 1/9/2021  Primary Care Physician: Estevan Izaguirre, APRN    Subjective     Chief Complaint: chest pain and body pain    History of Present Illness  Patient states that she has been having some chest pain and pain all over her body for the last 2 days.  " She describes the chest pain as being located over on the right side and worse when she coughs.  The allover body pain is somewhat difficult for her to give a description but she just says she overall does not feel well and it hurts down even into her feet.  She denies having any fever.  She does states she maybe is felt a little bit short of breath and again is having a cough.  She has had no nausea vomiting or diarrhea.  She does smoke half a pack of cigarettes a day for over the past over 20 years.  She does not wear home oxygen.  In the ER the patient had a chest x-ray that was unremarkable.  She was noted to be hypoxic with ambulation to 86%.  CT a was unremarkable for PE or any evidence of pneumonia or other explanation for hypoxia at this time.  Patient did have elevated blood pressures of 200/100 and was treated with IV normodyme.  She improved somewhat with this treatment.  She states that she had previously been on medication however has not taken this for some time.  The only thing she takes at home is medicine for reflux.  Her troponins have been normal x2.  She has been placed on 2 L nasal cannula at this time and maintains her sats around 92%.  In the ER she was treated with an inhaler, steroids and a dose of antibiotics.        Review of Systems     Otherwise complete ROS reviewed and negative except as mentioned in the HPI.    Past Medical History:   Past Medical History:   Diagnosis Date   • GERD (gastroesophageal reflux disease)    • Hypertension    • Shoulder dislocation    • TMJ (temporomandibular joint syndrome)      Past Surgical History:  Past Surgical History:   Procedure Laterality Date   • ENDOSCOPY N/A 1/22/2019    Procedure: ESOPHAGOGASTRODUODENOSCOPY WITH ANESTHESIA;  Surgeon: Khari Ramirez DO;  Location: [facility] ENDOSCOPY;  Service: Gastroenterology   • HYSTERECTOMY       Social History:  reports that she has been smoking. She has a 2.50 pack-year smoking history. She has never used  "smokeless tobacco. She reports that she does not drink alcohol or use drugs.    Family History: Asked about mother and father family history of hypertension diabetes heart disease or cancer and patient denies both and states that she does not know of any illness in her mother or father       Allergies:  No Known Allergies  Medications:    Of these medicines listed below the only thing the patient was taking was the PPI and the Carafate    Prior to Admission medications    Medication Sig Start Date End Date Taking? Authorizing Provider   dicyclomine (BENTYL) 20 MG tablet Take 1 tablet by mouth Every 6 (Six) Hours As Needed (abdominal pain). 5/7/18   Wale Sanchez MD   ibuprofen (ADVIL,MOTRIN) 600 MG tablet Take 1 tablet by mouth Every 8 (Eight) Hours As Needed for Mild Pain (1-3). 6/15/17   Jennifer Mirza APRN   lisinopril (PRINIVIL,ZESTRIL) 30 MG tablet Take 30 mg by mouth Daily.    ProviderYolette MD   methylPREDNISolone (MEDROL, SHELBY,) 4 MG tablet Take as directed on package instructions. 12/15/19   Castleman, Danna D, PA   pantoprazole (PROTONIX) 40 MG EC tablet Take 40 mg by mouth Daily.    Yolette Banda MD   sucralfate (CARAFATE) 1 g tablet Take 1 g by mouth 4 (Four) Times a Day.    Yolette Banda MD     Objective     Vital Signs: /96   Pulse 86   Temp 97.9 °F (36.6 °C) (Oral)   Resp 22   Ht 167.6 cm (66\")   Wt 95.3 kg (210 lb)   SpO2 93%   BMI 33.89 kg/m²   Physical Exam  Vitals signs reviewed.   Constitutional:       General: She is not in acute distress.     Appearance: She is obese. She is not ill-appearing, toxic-appearing or diaphoretic.      Comments: Patient speaks to me in a very mumbled tone.  She does answer my questions and they are appropriate however sometimes I have to ask her several times   HENT:      Head: Normocephalic and atraumatic.      Mouth/Throat:      Mouth: Mucous membranes are moist.      Pharynx: Oropharynx is clear.   Eyes:      " Extraocular Movements: Extraocular movements intact.      Conjunctiva/sclera: Conjunctivae normal.      Pupils: Pupils are equal, round, and reactive to light.   Neck:      Musculoskeletal: Normal range of motion and neck supple. No muscular tenderness.   Cardiovascular:      Rate and Rhythm: Normal rate and regular rhythm.      Pulses: Normal pulses.      Heart sounds: No murmur.   Pulmonary:      Effort: Pulmonary effort is normal. No respiratory distress.      Breath sounds: Normal breath sounds. No wheezing or rhonchi.      Comments: Does not have any wheeze that I can appreciate.  She is not tachypneic and in no distress with no increased work of breathing  Abdominal:      General: Bowel sounds are normal. There is no distension.      Palpations: Abdomen is soft.      Tenderness: There is no abdominal tenderness.   Musculoskeletal: Normal range of motion.         General: No swelling or tenderness.   Skin:     General: Skin is warm and dry.      Findings: No erythema or rash.   Neurological:      General: No focal deficit present.      Mental Status: She is alert and oriented to person, place, and time.      Cranial Nerves: No cranial nerve deficit.      Motor: No weakness.   Psychiatric:         Mood and Affect: Mood normal.         Behavior: Behavior normal.              Results Reviewed:  Lab Results (last 24 hours)     Procedure Component Value Units Date/Time    Urine Drug Screen - Urine, Clean Catch [[id number]]  (Normal) Collected: 01/09/21 1105    Specimen: Urine, Clean Catch Updated: 01/09/21 1130     THC, Screen, Urine Negative     Phencyclidine (PCP), Urine Negative     Cocaine Screen, Urine Negative     Methamphetamine, Ur Negative     Opiate Screen Negative     Amphetamine Screen, Urine Negative     Benzodiazepine Screen, Urine Negative     Tricyclic Antidepressants Screen Negative     Methadone Screen, Urine Negative     Barbiturates Screen, Urine Negative     Oxycodone Screen, Urine Negative      Propoxyphene Screen Negative     Buprenorphine, Screen, Urine Negative    Narrative:      Cutoff For Drugs Screened:    Amphetamines               500 ng/ml  Barbiturates               200 ng/ml  Benzodiazepines            150 ng/ml  Cocaine                    150 ng/ml  Methadone                  200 ng/ml  Opiates                    100 ng/ml  Phencyclidine               25 ng/ml  THC                            50 ng/ml  Methamphetamine            500 ng/ml  Tricyclic Antidepressants  300 ng/ml  Oxycodone                  100 ng/ml  Propoxyphene               300 ng/ml  Buprenorphine               10 ng/ml    The normal value for all drugs tested is negative. This report includes unconfirmed screening results, with the cutoff values listed, to be used for medical treatment purposes only.  Unconfirmed results must not be used for non-medical purposes such as employment or legal testing.  Clinical consideration should be applied to any drug of abuse test, particularly when unconfirmed results are used.      Troponin [[id number]]  (Normal) Collected: 01/09/21 0915    Specimen: Blood Updated: 01/09/21 0939     Troponin T <0.010 ng/mL     Narrative:      Troponin T Reference Range:  <= 0.03 ng/mL-   Negative for AMI  >0.03 ng/mL-     Abnormal for myocardial necrosis.  Clinicians would have to utilize clinical acumen, EKG, Troponin and serial changes to determine if it is an Acute Myocardial Infarction or myocardial injury due to an underlying chronic condition.       Results may be falsely decreased if patient taking Biotin.      COVID PRE-OP / PRE-PROCEDURE SCREENING ORDER (NO ISOLATION) - Swab, Nasal Cavity [[id number]]  (Normal) Collected: 01/09/21 0659    Specimen: Swab from Nasal Cavity Updated: 01/09/21 0843    Narrative:      The following orders were created for panel order COVID PRE-OP / PRE-PROCEDURE SCREENING ORDER (NO ISOLATION) - Swab, Nasal Cavity.  Procedure                               Abnormality          Status                     ---------                               -----------         ------                     COVID-19,[facility] IN-ROCHELLE...[[id number]]  Normal              Final result                 Please view results for these tests on the individual orders.    COVID-19,[facility] IN-HOUSE,Nasal Swab No Transport Media 3-4 HR TAT - Swab, Nasal Cavity [[id number]]  (Normal) Collected: 01/09/21 0659    Specimen: Swab from Nasal Cavity Updated: 01/09/21 0843     COVID19 Not Detected    Narrative:      Fact sheet for providers: https://www.fda.gov/media/613662/download     Fact sheet for patients: https://www.fda.gov/media/595481/download    Test performed by PCR.    Blood Gas, Arterial - [[id number]]  (Abnormal) Collected: 01/09/21 0800    Specimen: Arterial Blood Updated: 01/09/21 0809     Site Right Brachial     Rey's Test N/A     pH, Arterial 7.378 pH units      pCO2, Arterial 46.0 mm Hg      Comment: 83 Value above reference range        pO2, Arterial 63.9 mm Hg      Comment: 84 Value below reference range        HCO3, Arterial 27.1 mmol/L      Comment: 83 Value above reference range        Base Excess, Arterial 1.4 mmol/L      O2 Saturation, Arterial 93.6 %      Comment: 84 Value below reference range        Temperature 37.0 C      Barometric Pressure for Blood Gas 758 mmHg      Modality Nasal Cannula     Flow Rate 2.0 lpm      Ventilator Mode NA     Collected by [id number]     Comment: Meter: [id number]     :  [id number]        pCO2, Temperature Corrected 46.0 mm Hg      pH, Temp Corrected 7.378 pH Units      pO2, Temperature Corrected 63.9 mm Hg     [facility] [[id number]] Collected: 01/09/21 0635    Specimen: Blood Updated: 01/09/21 0745    Narrative:      The following orders were created for panel order [facility].  Procedure                               Abnormality         Status                     ---------                               -----------         ------                     Light  Blue Top[[id number]]                                   Final result               Green Top (Gel)[[id number]]                                  Final result               Lavender Top[[id number]]                                     Final result               Red Top[[id number]]                                          Final result                 Please view results for these tests on the individual orders.    Light Blue Top [[id number]] Collected: 01/09/21 0635    Specimen: Blood Updated: 01/09/21 0745     Extra Tube hold for add-on     Comment: Auto resulted       Green Top (Gel) [[id number]] Collected: 01/09/21 0635    Specimen: Blood Updated: 01/09/21 0745     Extra Tube Hold for add-ons.     Comment: Auto resulted.       Red Top [[id number]] Collected: 01/09/21 0635    Specimen: Blood Updated: 01/09/21 0745     Extra Tube Hold for add-ons.     Comment: Auto resulted.       Lavender Top [[id number]] Collected: 01/09/21 0635    Specimen: Blood Updated: 01/09/21 0745     Extra Tube hold for add-on     Comment: Auto resulted       CBC & Differential [[id number]]  (Abnormal) Collected: 01/09/21 0635    Specimen: Blood Updated: 01/09/21 0725    Narrative:      The following orders were created for panel order CBC & Differential.  Procedure                               Abnormality         Status                     ---------                               -----------         ------                     CBC Auto Differential[[id number]]        Abnormal            Final result                 Please view results for these tests on the individual orders.    CBC Auto Differential [[id number]]  (Abnormal) Collected: 01/09/21 0635    Specimen: Blood Updated: 01/09/21 0725     WBC 13.60 10*3/mm3      RBC 4.54 10*6/mm3      Hemoglobin 13.2 g/dL      Hematocrit 40.8 %      MCV 89.9 fL      MCH 29.1 pg      MCHC 32.4 g/dL      RDW 12.9 %      RDW-SD 42.3 fl      MPV 10.5 fL      Platelets 329 10*3/mm3      Neutrophil % 74.5 %       Lymphocyte % 16.3 %      Monocyte % 7.4 %      Eosinophil % 0.8 %      Basophil % 0.7 %      Immature Grans % 0.3 %      Neutrophils, Absolute 10.12 10*3/mm3      Lymphocytes, Absolute 2.22 10*3/mm3      Monocytes, Absolute 1.01 10*3/mm3      Eosinophils, Absolute 0.11 10*3/mm3      Basophils, Absolute 0.10 10*3/mm3      Immature Grans, Absolute 0.04 10*3/mm3      nRBC 0.0 /100 WBC     hCG, Serum, Qualitative [[id number]]  (Normal) Collected: 01/09/21 0635    Specimen: Blood Updated: 01/09/21 0712     HCG Qualitative Negative    C-reactive Protein [[id number]]  (Abnormal) Collected: 01/09/21 0635    Specimen: Blood Updated: 01/09/21 0711     C-Reactive Protein 0.72 mg/dL     Comprehensive Metabolic Panel [[id number]]  (Abnormal) Collected: 01/09/21 0635    Specimen: Blood Updated: 01/09/21 0709     Glucose 131 mg/dL      BUN 12 mg/dL      Creatinine 0.71 mg/dL      Sodium 139 mmol/L      Potassium 4.0 mmol/L      Chloride 104 mmol/L      CO2 26.0 mmol/L      Calcium 8.9 mg/dL      Total Protein 7.6 g/dL      Albumin 4.20 g/dL      ALT (SGPT) 20 U/L      AST (SGOT) 14 U/L      Alkaline Phosphatase 118 U/L      Total Bilirubin 0.3 mg/dL      eGFR Non African Amer 91 mL/min/1.73      Globulin 3.4 gm/dL      A/G Ratio 1.2 g/dL      BUN/Creatinine Ratio 16.9     Anion Gap 9.0 mmol/L     Narrative:      GFR Normal >60  Chronic Kidney Disease <60  Kidney Failure <15      CK [[id number]]  (Normal) Collected: 01/09/21 0635    Specimen: Blood Updated: 01/09/21 0708     Creatine Kinase 95 U/L     Troponin [[id number]]  (Normal) Collected: 01/09/21 0635    Specimen: Blood Updated: 01/09/21 0706     Troponin T <0.010 ng/mL     Narrative:      Troponin T Reference Range:  <= 0.03 ng/mL-   Negative for AMI  >0.03 ng/mL-     Abnormal for myocardial necrosis.  Clinicians would have to utilize clinical acumen, EKG, Troponin and serial changes to determine if it is an Acute Myocardial Infarction or myocardial injury due to an underlying  chronic condition.       Results may be falsely decreased if patient taking Biotin.      BNP [[id number]]  (Normal) Collected: 01/09/21 0635    Specimen: Blood Updated: 01/09/21 0706     proBNP 39.4 pg/mL     Narrative:      Among patients with dyspnea, NT-proBNP is highly sensitive for the detection of acute congestive heart failure. In addition NT-proBNP of <300 pg/ml effectively rules out acute congestive heart failure with 99% negative predictive value.    Results may be falsely decreased if patient taking Biotin.      Protime-INR [[id number]]  (Normal) Collected: 01/09/21 0635    Specimen: Blood Updated: 01/09/21 0701     Protime 12.0 Seconds      INR 0.92    aPTT [[id number]]  (Normal) Collected: 01/09/21 0635    Specimen: Blood Updated: 01/09/21 0701     PTT 26.0 seconds     D-dimer, Quantitative [[id number]]  (Abnormal) Collected: 01/09/21 0635    Specimen: Blood Updated: 01/09/21 0701     D-Dimer, Quantitative 0.59 mg/L (FEU)     Narrative:      Reference Range is 0-0.50 mg/L FEU. However, results <0.50 mg/L FEU tends to rule out DVT or PE. Results >0.50 mg/L FEU are not useful in predicting absence or presence of DVT or PE.          Imaging Results (Last 24 Hours)     Procedure Component Value Units Date/Time    CT Angiogram Chest [[id number]] Collected: 01/09/21 1016     Updated: 01/09/21 1024    Narrative:      EXAMINATION: CT ANGIOGRAM CHEST- 1/9/2021 10:16 AM CST     HISTORY: Positive d-dimer, chest pain     COMPARISON: Chest x-ray 1/9/2021     DOSE: 599 mGy-cm     TECHNIQUE: Sequential imaging was performed from the thoracic inlet  through the upper abdomen following the administration of IV contrast.   Sagittal and coronal reformations were made from the original source  data and reviewed. Additionally, 3-D MIPS reconstructions of the vessels  were made per CTA protocol. Automated exposure control was also utilized  to decrease patient radiation dose.     FINDINGS:   The visualized thyroid gland is  grossly normal in appearance. The  trachea and main bronchi appear widely patent and in normal anatomic  position. The esophagus is grossly normal in appearance.     No pathologically enlarged axillary, mediastinal, or hilar lymph nodes  are identified.     The heart appears mildly enlarged. There is no pericardial or pleural  effusion. The ascending thoracic aorta and main pulmonary artery appear  normal in caliber. The pulmonary arteries are well opacified, and there  are no filling defects to suggest PE.     There is atelectasis in both lung bases. The lungs otherwise appear  clear.     Review of the visualized portion of the upper abdomen demonstrates no  acute abnormalities.     Review of the visualized osseous structures demonstrates no acute or  aggressive lesions.       Impression:      1. No evidence of PE or acute aortic pathology.     2. Mild cardiomegaly.  3. Dependent atelectasis in the lung bases.           This report was finalized on 01/09/2021 10:21 by Dr. Tera Saucedo MD.    XR Chest 1 View [[id number]] Collected: 01/09/21 0722     Updated: 01/09/21 0726    Narrative:      EXAMINATION: XR CHEST 1 VW- 1/9/2021 7:22 AM CST     HISTORY: Chest pain     COMPARISON: None     FINDINGS:  The heart and mediastinal contours appear normal. Lungs are clear  without focal consolidation or effusion. No pneumothorax is appreciated.  Pulmonary vasculature appears grossly normal.       Impression:      No evidence of acute cardiopulmonary process.  This report was finalized on 01/09/2021 07:23 by Dr. Tera Saucedo MD.        I have personally reviewed and interpreted the radiology studies and ECG obtained at time of admission.     Assessment / Plan     Assessment:   Active Hospital Problems    Diagnosis   • Hypoxic   • Uncontrolled hypertension   • Chest pain, atypical         Plan:    1. Check 2 d echo  2. Restart lisinopril for her and add prn agent  3. Continue nasal cannula for now and wean as tolerates  4.  Oral steroids, nebs, azithro, mucinex for possible upper respiratory infection      Code Status: full     I discussed the patient's findings and my recommendations with the patient    Estimated length of stay 1 day    Patient seen and examined by me on 1/9/21 at 1300.    Electronically signed by Cary Paul MD, 01/09/21, 13:00 CST.              Electronically signed by Cary Paul MD at 01/09/21 1302          Emergency Department Notes      Mark Silver MD at 01/09/21 0644          Subjective   This patient is 40 years old came to the ER via ambulance she not able to give a history because her whole body hurts and she cannot sit up in bed for me to listen to her chest since her body hurts she is having cough and myalgias.  She has had history hypertension of the take any medications      Chest Pain  Pain location:  R chest and L chest  Pain quality: aching and tightness    Pain radiates to:  Does not radiate  Pain severity:  Moderate  Onset quality:  Gradual  Duration:  5 days  Timing:  Constant  Progression:  Unchanged  Chronicity:  New  Context: breathing    Context: not drug use, not eating, not lifting, not movement, not raising an arm, not at rest and not stress    Relieved by:  Nothing  Worsened by:  Coughing and deep breathing  Ineffective treatments:  None tried  Associated symptoms: cough and fatigue    Associated symptoms: no abdominal pain, no AICD problem, no altered mental status, no anorexia, no anxiety, no back pain, no diaphoresis, no dizziness, no dysphagia, no fever, no headache, no heartburn, no nausea, no numbness, no shortness of breath, no syncope, no vomiting and no weakness    Cough  Associated symptoms: chest pain    Associated symptoms: no chills, no diaphoresis, no ear pain, no eye discharge, no fever, no headaches, no myalgias, no rash, no shortness of breath and no sore throat        Review of Systems   Constitutional: Positive for fatigue. Negative for activity change,  appetite change, chills, diaphoresis and fever.   HENT: Negative for congestion, drooling, ear pain, facial swelling, hearing loss, sinus pressure, sore throat and trouble swallowing.    Eyes: Negative.  Negative for discharge.   Respiratory: Positive for cough. Negative for shortness of breath.    Cardiovascular: Positive for chest pain. Negative for syncope.   Gastrointestinal: Negative for abdominal distention, abdominal pain, anorexia, blood in stool, diarrhea, heartburn, nausea and vomiting.   Endocrine: Negative.  Negative for cold intolerance, heat intolerance, polydipsia, polyphagia and polyuria.   Genitourinary: Negative.  Negative for dysuria, flank pain and urgency.   Musculoskeletal: Negative.  Negative for arthralgias, back pain, myalgias and neck stiffness.   Skin: Negative.  Negative for color change, pallor and rash.   Allergic/Immunologic: Negative.    Neurological: Negative.  Negative for dizziness, seizures, speech difficulty, weakness, numbness and headaches.   Hematological: Negative.  Negative for adenopathy.   All other systems reviewed and are negative.      Past Medical History:   Diagnosis Date   • GERD (gastroesophageal reflux disease)    • Hypertension    • Shoulder dislocation    • TMJ (temporomandibular joint syndrome)        No Known Allergies    Past Surgical History:   Procedure Laterality Date   • ENDOSCOPY N/A 1/22/2019    Procedure: ESOPHAGOGASTRODUODENOSCOPY WITH ANESTHESIA;  Surgeon: Khari Ramirez DO;  Location: [facility] ENDOSCOPY;  Service: Gastroenterology   • HYSTERECTOMY         Family History   Problem Relation Age of Onset   • Colon cancer Neg Hx    • Colon polyps Neg Hx    • Esophageal cancer Neg Hx    • Breast cancer Neg Hx        Social History     Socioeconomic History   • Marital status:      Spouse name: Not on file   • Number of children: Not on file   • Years of education: Not on file   • Highest education level: Not on file   Tobacco Use   • Smoking  status: Current Some Day Smoker     Packs/day: 0.25     Years: 5.00     Pack years: 1.25   • Smokeless tobacco: Never Used   Substance and Sexual Activity   • Alcohol use: No   • Drug use: No   Social History Narrative               Objective   Physical Exam  Vitals signs and nursing note reviewed. Exam conducted with a chaperone present.   Constitutional:       General: She is not in acute distress.     Appearance: Normal appearance. She is well-developed. She is not toxic-appearing or diaphoretic.   HENT:      Head: Normocephalic and atraumatic.      Right Ear: External ear normal.      Nose: Nose normal.      Mouth/Throat:      Mouth: Mucous membranes are moist.   Eyes:      Conjunctiva/sclera: Conjunctivae normal.      Pupils: Pupils are equal, round, and reactive to light.   Neck:      Musculoskeletal: Normal range of motion and neck supple. No neck rigidity.   Cardiovascular:      Rate and Rhythm: Normal rate and regular rhythm.      Chest Wall: PMI is not displaced.      Pulses: Normal pulses. No decreased pulses.      Heart sounds: Normal heart sounds. No murmur.   Pulmonary:      Effort: Pulmonary effort is normal. No tachypnea, accessory muscle usage or respiratory distress.      Breath sounds: No stridor. Examination of the right-middle field reveals rhonchi. Examination of the left-middle field reveals rhonchi. Examination of the right-lower field reveals rhonchi. Examination of the left-lower field reveals rhonchi. Rhonchi present. No decreased breath sounds, wheezing or rales.   Chest:      Chest wall: Tenderness present. No crepitus.   Abdominal:      General: Bowel sounds are normal. There is no distension.      Palpations: Abdomen is soft.      Tenderness: There is no abdominal tenderness.   Musculoskeletal: Normal range of motion.         General: No swelling or tenderness.      Comments: Lower extremity exam bilaterally is unremarkable.  There is no right or left calf tenderness .  There is  no palpable venous cord.  No obvious difference in the size of the legs.  No pitting edema.  The dorsalis pedis and posterior tibial femoral and popliteal pulses are palpable and +2 bilaterally.  Homans sign is negative   Skin:     General: Skin is warm.      Capillary Refill: Capillary refill takes less than 2 seconds.      Coloration: Skin is not jaundiced.      Findings: No erythema or rash.   Neurological:      General: No focal deficit present.      Mental Status: She is alert and oriented to person, place, and time. Mental status is at baseline.      Cranial Nerves: No cranial nerve deficit.      Motor: No weakness.      Coordination: Coordination normal.      Deep Tendon Reflexes: Reflexes are normal and symmetric. Reflexes normal.   Psychiatric:         Mood and Affect: Mood normal.         Procedures          ED Course  ED Course as of Jan 09 1221   Sat Jan 09, 2021   0648 EKG showed normal sinus rhythm rate of 81 bpm  ms QRS duration 78 ms acute ischemia  PPE was utilized     [TS]   0937 Normal sinus rhythm    [TS]   1214 This patient came in with shortness of breath cough congestion hurting all over her Covid test was negative her oxygen saturations have run down to 8045% a couple of times I have given neb treatments and watched in the ER whenever she taken off the oxygen her sats dropped down to 88% especially with exertion they have gone down to 86% she does not appear toxic otherwise CT of the chest was negative for PE and a Covid swabs have been negative I have discussed this case with the hospitalist will admit her to the hospital    [TS]   1216 Cessation of this patient given the chest wall pain pleurisy CT of the chest is negative for any PE is a urine drug is negative her blood pressure has been on the elevated side which responded to labetalol she was given IV antibiotic steroids neb treatments in the ER her oxygen saturations on 2 L of oxygen have remained the range of 94 to 95%  The  problem is when you take her off the oxygen her sats dropped down to 88 even lower if she exerts this probably is related to reactive airway disease more than anything else since her CT of the chest negative for pulmonary embolus there is no evidence of CHF she does have some cardiomegaly but no evidence of pericardial effusion and no evidence of cardiac tamponade on physical examination this patient may have undiagnosed pulmonary hypertension require further work-up with echo's and pulmonary function testing    [TS]      ED Course User Index  [TS] Mark Silver MD                                         HEART Score (for prediction of 6-week risk of major adverse cardiac event) reviewed and/or performed as part of the patient evaluation and treatment planning process.  The result associated with this review/performance is: 0    PERC Rule (for pulmonary embolism) reviewed and/or performed as part of the patient evaluation and treatment planning process.  The result associated with this review/performance is: 0       MDM  Number of Diagnoses or Management Options  Diagnosis management comments: Patient with chest wall tenderness and cough congestion may have Covid or other vital infections he is wheezing at this time  Differential Diagnosis:  I considered chest wall pain, muscle strain, costochondritis, pleurisy, rib fracture, herpes zoster, cardiovascular etiology, myocardial infarction, intermediate coronary syndrome, unstable angina, angina, aortic dissection, pericarditis, pulmonary etiology, pulmonary embolism, pneumonia, pneumothorax, lung cancer, gastroesophageal reflux disease, esophagitis, esophageal spasm and gastrointestinal etiology as a possible cause of chest pain in this patient. This is a partial list of diagnoses considered.    Differential Diagnosis:  I considered pulmonary etiology, asthma, chronic obstructive pulmonary disease, pneumonia, pulmonary embolism, adult respiratory distress syndrome,  pneumothorax, pleural effusion, pulmonary fibrosis, cardiac etiology, congestive heart failure, myocardial infarction, metabolic etiology, diabetic ketoacidosis, uremia, acidosis, sepsis, anemia, drug related etiology, hyperventilation and CNS disease as a possible cause of dyspnea in this patient. This is a partial list of diagnoses considered.            Amount and/or Complexity of Data Reviewed  Clinical lab tests: ordered and reviewed  Tests in the radiology section of CPT®: ordered and reviewed  Tests in the medicine section of CPT®: reviewed and ordered    Risk of Complications, Morbidity, and/or Mortality  Presenting problems: low  Diagnostic procedures: low  Management options: low  General comments: Patient had cough wheezing chest wall pain will give neb treatment and steroids get lab work-up tested for Covid get used to the cardiac markers her heart score is 0 and her PERC score is 0        Final diagnoses:   Hypoxic   Reactive airway disease with acute exacerbation, unspecified asthma severity, unspecified whether persistent   Essential hypertension            Mark Silver MD  01/09/21 1221      Electronically signed by Mark Silver MD at 01/09/21 1221     Emanuel Avila, RN at 01/09/21 0800        Patient placed on 2L     Emanuel Avila, RN  01/09/21 0810      Electronically signed by Emanuel Avila, RN at 01/09/21 0810       Vital Signs (last 3 days) before discharge     Date/Time   Temp   Temp src   Pulse   Resp   BP   Patient Position   SpO2    01/10/21 1045   98 (36.7)   Oral   87   20   111/86   Lying   96    01/10/21 0731   97.7 (36.5)   Oral   75   20   136/71   Lying   94    01/10/21 0715   --   --   65   20   --   --   93    01/10/21 0709   --   --   80   18   --   --   97    01/10/21 0425   98.2 (36.8)   Oral   78   16   140/69   Lying   95    01/09/21 2339   98.1 (36.7)   Oral   104   16   146/72   Lying   96    01/09/21 2032   --   --   94   16   --   --   --    01/09/21 2026    --   --   92   16   --   --   95    01/09/21 1922   98.4 (36.9)   Oral   102   18   166/84   Lying   95    BP: Josie HUDSON RN Notified at 01/09/21 1922 01/09/21 1516   98.8 (37.1)   Oral   101   20   154/93   Lying   96    01/09/21 14:59:03   98.4 (36.9)   Oral   105   21   150/91   --   91    01/09/21 1451   --   --   101   --   --   --   (!) 88    SpO2: ra-pt has removed her o2.  nasal cannula returned.  pt made aware that she being admitted due to low o2 & must keep cannula in place.   at 01/09/21 1451    01/09/21 1450   --   --   102   --   --   --   (!) 86    SpO2: ra at 01/09/21 1450    01/09/21 1330   --   --   101   --   133/90   --   91    01/09/21 1300   --   --   101   --   144/94   --   94    01/09/21 1247   --   --   86   22   146/96   --   93    01/09/21 11:45:30   --   --   --   --   145/84   --   --    01/09/21 1131   --   --   100   --   139/87   Standing   --    01/09/21 1130   --   --   84   --   152/84   Lying   --    01/09/21 1118   --   --   92   20   --   --   91    SpO2: RA at 01/09/21 1118    01/09/21 1115   --   --   89   --   --   --   (!) 88    SpO2: RA at 01/09/21 1115    01/09/21 11:07:32   --   --   89   22   --   --   92    SpO2: ra at 01/09/21 1107    01/09/21 1024   --   --   89   --   --   --   95    01/09/21 0931   --   --   84   --   --   --   93    01/09/21 0930   --   --   91   --   152/92   --   96    01/09/21 0915   --   --   --   --   163/92   --   --    01/09/21 0900   --   --   86   --   131/81   --   95    01/09/21 0810   --   --   96   --   --   --   96    01/09/21 0809   --   --   104   --   --   --   96    01/09/21 0808   --   --   100   --   (!) 166/103   --   93    01/09/21 0745   --   --   104   --   154/90   --   (!) 86    01/09/21 0726   --   --   92   12   --   --   98    01/09/21 0721   --   --   85   9   --   --   100    01/09/21 0716   --   --   87   11   --   --   97    01/09/21 0645   --   --   81   --   (!) 176/119   --   98    01/09/21 0632   --   --   --    --   (!) 203/119   Lying   --    01/09/21 0630   --   --   --   --   --   Lying   --    01/09/21 0629   97.9 (36.6)   Oral   85   20   --   --   98              Oxygen Therapy (last 2 days) before discharge     Date/Time   SpO2   Device (Oxygen Therapy)   Flow (L/min)   Oxygen Concentration (%)   ETCO2 (mmHg)    01/10/21 1045   96   nasal cannula   1.5   --   --    01/10/21 0832   --   nasal cannula   1.5   --   --    01/10/21 0731   94   nasal cannula   1.5   --   --    01/10/21 0715   93   nasal cannula   1.5   --   --    01/10/21 0709   97   nasal cannula   2   --   --    01/10/21 0425   95   nasal cannula   2   --   --    01/09/21 2339   96   nasal cannula   2   --   --    01/09/21 2100   --   nasal cannula   2   --   --    01/09/21 2026   95   nasal cannula   2   --   --    01/09/21 1922   95   nasal cannula   2   --   --    01/09/21 1516   96   nasal cannula   2   --   --    01/09/21 14:59:03   91   nasal cannula   --   --   --    01/09/21 1451   (!) 88   --   --   --   --    01/09/21 1450   (!) 86   --   --   --   --    01/09/21 1330   91   --   --   --   --    01/09/21 1300   94   --   --   --   --    01/09/21 1247   93   --   --   --   --    01/09/21 1118   91   --   --   --   --    01/09/21 1115   (!) 88   --   --   --   --    01/09/21 11:07:32   92   --   --   --   --    01/09/21 1024   95   --   --   --   --    01/09/21 0931   93   --   --   --   --    01/09/21 0930   96   --   --   --   --    01/09/21 0900   95   --   --   --   --    01/09/21 0810   96   --   --   --   --    01/09/21 0809   96   --   --   --   --    01/09/21 0808   93   --   --   --   --    01/09/21 0746   --   nasal cannula   2   --   --    01/09/21 0745   (!) 86   --   --   --   --    01/09/21 0726   98   room air   --   --   --    01/09/21 0721   100   room air   --   --   --    01/09/21 0716   97   room air   --   --   --    01/09/21 0645   98   --   --   --   --    01/09/21 0629   98   --   --   --   --            Intake & Output  (last 7 days)       01/04 0701 - 01/05 0700 01/05 0701 - 01/06 0700 01/06 0701 - 01/07 0700 01/07 0701 - 01/08 0700 01/08 0701 - 01/09 0700 01/09 0701 - 01/10 0700 01/10 0701 - 01/11 0700    P.O.      240 840    Total Intake(mL/kg)      240 (2.3) 840 (8)    Urine (mL/kg/hr)       800 (0.3)    Total Output       800    Net      +240 +40               Stool Unmeasured Occurrence      1 x           Lines, Drains & Airways    Active LDAs     None         Inactive LDAs     Name:   Placement date:   Placement time:   Removal date:   Removal time:   Site:   Days:    [REMOVED] Peripheral IV 05/07/18 0050 Right Antecubital   05/07/18    0050    01/09/21 not present on arrival    --    Antecubital   978    [REMOVED] Peripheral IV 01/09/21 0653 Left Antecubital   01/09/21    0653    01/10/21    1257    Antecubital   1                  Facility-Administered Medications as of 1/10/2021   Medication Dose Route Frequency Provider Last Rate Last Admin   • [COMPLETED] albuterol (PROVENTIL) nebulizer solution 0.083% 2.5 mg/3mL  2.5 mg Nebulization Once Mark Silver MD   2.5 mg at 01/09/21 0721   • [COMPLETED] AZITHROMYCIN 500 MG/250 ML 0.9% NS IVPB (vial-mate)  500 mg Intravenous Once Mark Silver MD   Stopped at 01/09/21 1350   • [COMPLETED] cefTRIAXone (ROCEPHIN) 1 g/100 mL 0.9% NS (MBP)  1 g Intravenous Once Mark Silver MD   Stopped at 01/09/21 1245   • [COMPLETED] iopamidol (ISOVUE-370) 76 % injection 100 mL  100 mL Intravenous Once in imaging Mark Silver MD   100 mL at 01/09/21 1006   • [COMPLETED] ipratropium-albuterol (DUO-NEB) nebulizer solution 3 mL  3 mL Nebulization Once Mark Silver MD   3 mL at 01/09/21 0716   • [COMPLETED] labetalol (NORMODYNE,TRANDATE) injection 10 mg  10 mg Intravenous Once Makr Silver MD   10 mg at 01/09/21 0808   • [COMPLETED] methylPREDNISolone sodium succinate (SOLU-Medrol) injection 125 mg  125 mg Intravenous Once Mark Silver MD   125 mg at 01/09/21 0654   • [COMPLETED]  ondansetron (ZOFRAN) injection 4 mg  4 mg Intravenous Once Mark Silver MD   4 mg at 01/09/21 0707   • [COMPLETED] perflutren (DEFINITY) lipid microspheres injection 8.476 mg  1.3 mL Intravenous Once Cary Paul MD   8.476 mg at 01/10/21 1112     Lab Results (all)     Procedure Component Value Units Date/Time    SCANNED - LABS [[id number]] Resulted: 01/09/21      Updated: 01/11/21 0004    Hemoglobin A1c [[id number]]  (Normal) Collected: 01/10/21 0342    Specimen: Blood Updated: 01/10/21 0425     Hemoglobin A1C 5.60 %     Narrative:      Hemoglobin A1C Ranges:    Increased Risk for Diabetes  5.7% to 6.4%  Diabetes                     >= 6.5%  Diabetic Goal                < 7.0%    Basic Metabolic Panel [[id number]]  (Abnormal) Collected: 01/10/21 0342    Specimen: Blood Updated: 01/10/21 0415     Glucose 191 mg/dL      BUN 10 mg/dL      Creatinine 0.71 mg/dL      Sodium 141 mmol/L      Potassium 4.3 mmol/L      Chloride 105 mmol/L      CO2 27.0 mmol/L      Calcium 9.0 mg/dL      eGFR Non African Amer 91 mL/min/1.73      BUN/Creatinine Ratio 14.1     Anion Gap 9.0 mmol/L     Narrative:      GFR Normal >60  Chronic Kidney Disease <60  Kidney Failure <15      CBC Auto Differential [[id number]]  (Abnormal) Collected: 01/10/21 0342    Specimen: Blood Updated: 01/10/21 0352     WBC 18.46 10*3/mm3      RBC 4.15 10*6/mm3      Hemoglobin 12.0 g/dL      Hematocrit 37.8 %      MCV 91.1 fL      MCH 28.9 pg      MCHC 31.7 g/dL      RDW 13.1 %      RDW-SD 44.0 fl      MPV 10.5 fL      Platelets 295 10*3/mm3      Neutrophil % 87.8 %      Lymphocyte % 6.8 %      Monocyte % 4.4 %      Eosinophil % 0.0 %      Basophil % 0.2 %      Immature Grans % 0.8 %      Neutrophils, Absolute 16.22 10*3/mm3      Lymphocytes, Absolute 1.26 10*3/mm3      Monocytes, Absolute 0.81 10*3/mm3      Eosinophils, Absolute 0.00 10*3/mm3      Basophils, Absolute 0.03 10*3/mm3      Immature Grans, Absolute 0.14 10*3/mm3      nRBC 0.0 /100 WBC      Troponin [[id number]]  (Normal) Collected: 01/09/21 1539    Specimen: Blood Updated: 01/09/21 1629     Troponin T <0.010 ng/mL     Narrative:      Troponin T Reference Range:  <= 0.03 ng/mL-   Negative for AMI  >0.03 ng/mL-     Abnormal for myocardial necrosis.  Clinicians would have to utilize clinical acumen, EKG, Troponin and serial changes to determine if it is an Acute Myocardial Infarction or myocardial injury due to an underlying chronic condition.       Results may be falsely decreased if patient taking Biotin.      Urine Drug Screen - Urine, Clean Catch [[id number]]  (Normal) Collected: 01/09/21 1105    Specimen: Urine, Clean Catch Updated: 01/09/21 1130     THC, Screen, Urine Negative     Phencyclidine (PCP), Urine Negative     Cocaine Screen, Urine Negative     Methamphetamine, Ur Negative     Opiate Screen Negative     Amphetamine Screen, Urine Negative     Benzodiazepine Screen, Urine Negative     Tricyclic Antidepressants Screen Negative     Methadone Screen, Urine Negative     Barbiturates Screen, Urine Negative     Oxycodone Screen, Urine Negative     Propoxyphene Screen Negative     Buprenorphine, Screen, Urine Negative    Narrative:      Cutoff For Drugs Screened:    Amphetamines               500 ng/ml  Barbiturates               200 ng/ml  Benzodiazepines            150 ng/ml  Cocaine                    150 ng/ml  Methadone                  200 ng/ml  Opiates                    100 ng/ml  Phencyclidine               25 ng/ml  THC                            50 ng/ml  Methamphetamine            500 ng/ml  Tricyclic Antidepressants  300 ng/ml  Oxycodone                  100 ng/ml  Propoxyphene               300 ng/ml  Buprenorphine               10 ng/ml    The normal value for all drugs tested is negative. This report includes unconfirmed screening results, with the cutoff values listed, to be used for medical treatment purposes only.  Unconfirmed results must not be used for non-medical purposes  such as employment or legal testing.  Clinical consideration should be applied to any drug of abuse test, particularly when unconfirmed results are used.      Troponin [[id number]]  (Normal) Collected: 01/09/21 0915    Specimen: Blood Updated: 01/09/21 0939     Troponin T <0.010 ng/mL     Narrative:      Troponin T Reference Range:  <= 0.03 ng/mL-   Negative for AMI  >0.03 ng/mL-     Abnormal for myocardial necrosis.  Clinicians would have to utilize clinical acumen, EKG, Troponin and serial changes to determine if it is an Acute Myocardial Infarction or myocardial injury due to an underlying chronic condition.       Results may be falsely decreased if patient taking Biotin.      COVID PRE-OP / PRE-PROCEDURE SCREENING ORDER (NO ISOLATION) - Swab, Nasal Cavity [[id number]]  (Normal) Collected: 01/09/21 0659    Specimen: Swab from Nasal Cavity Updated: 01/09/21 0843    Narrative:      The following orders were created for panel order COVID PRE-OP / PRE-PROCEDURE SCREENING ORDER (NO ISOLATION) - Swab, Nasal Cavity.  Procedure                               Abnormality         Status                     ---------                               -----------         ------                     COVID-19,[facility]...[[id number]]  Normal              Final result                 Please view results for these tests on the individual orders.    COVID-19,[facility] IN-HOUSE,Nasal Swab No Transport Media 3-4 HR TAT - Swab, Nasal Cavity [[id number]]  (Normal) Collected: 01/09/21 0659    Specimen: Swab from Nasal Cavity Updated: 01/09/21 0843     COVID19 Not Detected    Narrative:      Fact sheet for providers: https://www.fda.gov/media/277996/download     Fact sheet for patients: https://www.fda.gov/media/242993/download    Test performed by PCR.    Blood Gas, Arterial - [[id number]]  (Abnormal) Collected: 01/09/21 0800    Specimen: Arterial Blood Updated: 01/09/21 0809     Site Right Brachial     Rey's Test N/A     pH, Arterial  7.378 pH units      pCO2, Arterial 46.0 mm Hg      Comment: 83 Value above reference range        pO2, Arterial 63.9 mm Hg      Comment: 84 Value below reference range        HCO3, Arterial 27.1 mmol/L      Comment: 83 Value above reference range        Base Excess, Arterial 1.4 mmol/L      O2 Saturation, Arterial 93.6 %      Comment: 84 Value below reference range        Temperature 37.0 C      Barometric Pressure for Blood Gas 758 mmHg      Modality Nasal Cannula     Flow Rate 2.0 lpm      Ventilator Mode NA     Collected by [id number]     Comment: Meter: [id number]     :  [id number]        pCO2, Temperature Corrected 46.0 mm Hg      pH, Temp Corrected 7.378 pH Units      pO2, Temperature Corrected 63.9 mm Hg     [place] Draw [[id number]] Collected: 01/09/21 0635    Specimen: Blood Updated: 01/09/21 0745    Narrative:      The following orders were created for panel order [place] Draw.  Procedure                               Abnormality         Status                     ---------                               -----------         ------                     Light Blue Top[[id number]]                                   Final result               Green Top (Gel)[[id number]]                                  Final result               Lavender Top[[id number]]                                     Final result               Red Top[[id number]]                                          Final result                 Please view results for these tests on the individual orders.    Light Blue Top [[id number]] Collected: 01/09/21 0635    Specimen: Blood Updated: 01/09/21 0745     Extra Tube hold for add-on     Comment: Auto resulted       Green Top (Gel) [[id number]] Collected: 01/09/21 0635    Specimen: Blood Updated: 01/09/21 0745     Extra Tube Hold for add-ons.     Comment: Auto resulted.       Red Top [[id number]] Collected: 01/09/21 0635    Specimen: Blood Updated: 01/09/21 0745     Extra Tube Hold for add-ons.      Comment: Auto resulted.       Lavbea Top [[id number]] Collected: 01/09/21 0635    Specimen: Blood Updated: 01/09/21 0745     Extra Tube hold for add-on     Comment: Auto resulted       CBC & Differential [[id number]]  (Abnormal) Collected: 01/09/21 0635    Specimen: Blood Updated: 01/09/21 0725    Narrative:      The following orders were created for panel order CBC & Differential.  Procedure                               Abnormality         Status                     ---------                               -----------         ------                     CBC Auto Differential[[id number]]        Abnormal            Final result                 Please view results for these tests on the individual orders.    CBC Auto Differential [[id number]]  (Abnormal) Collected: 01/09/21 0635    Specimen: Blood Updated: 01/09/21 0725     WBC 13.60 10*3/mm3      RBC 4.54 10*6/mm3      Hemoglobin 13.2 g/dL      Hematocrit 40.8 %      MCV 89.9 fL      MCH 29.1 pg      MCHC 32.4 g/dL      RDW 12.9 %      RDW-SD 42.3 fl      MPV 10.5 fL      Platelets 329 10*3/mm3      Neutrophil % 74.5 %      Lymphocyte % 16.3 %      Monocyte % 7.4 %      Eosinophil % 0.8 %      Basophil % 0.7 %      Immature Grans % 0.3 %      Neutrophils, Absolute 10.12 10*3/mm3      Lymphocytes, Absolute 2.22 10*3/mm3      Monocytes, Absolute 1.01 10*3/mm3      Eosinophils, Absolute 0.11 10*3/mm3      Basophils, Absolute 0.10 10*3/mm3      Immature Grans, Absolute 0.04 10*3/mm3      nRBC 0.0 /100 WBC     hCG, Serum, Qualitative [[id number]]  (Normal) Collected: 01/09/21 0635    Specimen: Blood Updated: 01/09/21 0712     HCG Qualitative Negative    C-reactive Protein [[id number]]  (Abnormal) Collected: 01/09/21 0635    Specimen: Blood Updated: 01/09/21 0711     C-Reactive Protein 0.72 mg/dL     Comprehensive Metabolic Panel [[id number]]  (Abnormal) Collected: 01/09/21 0635    Specimen: Blood Updated: 01/09/21 0709     Glucose 131 mg/dL      BUN 12 mg/dL      Creatinine  0.71 mg/dL      Sodium 139 mmol/L      Potassium 4.0 mmol/L      Chloride 104 mmol/L      CO2 26.0 mmol/L      Calcium 8.9 mg/dL      Total Protein 7.6 g/dL      Albumin 4.20 g/dL      ALT (SGPT) 20 U/L      AST (SGOT) 14 U/L      Alkaline Phosphatase 118 U/L      Total Bilirubin 0.3 mg/dL      eGFR Non African Amer 91 mL/min/1.73      Globulin 3.4 gm/dL      A/G Ratio 1.2 g/dL      BUN/Creatinine Ratio 16.9     Anion Gap 9.0 mmol/L     Narrative:      GFR Normal >60  Chronic Kidney Disease <60  Kidney Failure <15      CK [[id number]]  (Normal) Collected: 01/09/21 0635    Specimen: Blood Updated: 01/09/21 0708     Creatine Kinase 95 U/L     Troponin [[id number]]  (Normal) Collected: 01/09/21 0635    Specimen: Blood Updated: 01/09/21 0706     Troponin T <0.010 ng/mL     Narrative:      Troponin T Reference Range:  <= 0.03 ng/mL-   Negative for AMI  >0.03 ng/mL-     Abnormal for myocardial necrosis.  Clinicians would have to utilize clinical acumen, EKG, Troponin and serial changes to determine if it is an Acute Myocardial Infarction or myocardial injury due to an underlying chronic condition.       Results may be falsely decreased if patient taking Biotin.      BNP [[id number]]  (Normal) Collected: 01/09/21 0635    Specimen: Blood Updated: 01/09/21 0706     proBNP 39.4 pg/mL     Narrative:      Among patients with dyspnea, NT-proBNP is highly sensitive for the detection of acute congestive heart failure. In addition NT-proBNP of <300 pg/ml effectively rules out acute congestive heart failure with 99% negative predictive value.    Results may be falsely decreased if patient taking Biotin.      Protime-INR [[id number]]  (Normal) Collected: 01/09/21 0635    Specimen: Blood Updated: 01/09/21 0701     Protime 12.0 Seconds      INR 0.92    aPTT [[id number]]  (Normal) Collected: 01/09/21 0635    Specimen: Blood Updated: 01/09/21 0701     PTT 26.0 seconds     D-dimer, Quantitative [[id number]]  (Abnormal) Collected: 01/09/21 0635     Specimen: Blood Updated: 01/09/21 0701     D-Dimer, Quantitative 0.59 mg/L (FEU)     Narrative:      Reference Range is 0-0.50 mg/L FEU. However, results <0.50 mg/L FEU tends to rule out DVT or PE. Results >0.50 mg/L FEU are not useful in predicting absence or presence of DVT or PE.            Imaging Results (All)     Procedure Component Value Units Date/Time    CT Angiogram Chest [[id number]] Collected: 01/09/21 1016     Updated: 01/09/21 1024    Narrative:      EXAMINATION: CT ANGIOGRAM CHEST- 1/9/2021 10:16 AM CST     HISTORY: Positive d-dimer, chest pain     COMPARISON: Chest x-ray 1/9/2021     DOSE: 599 mGy-cm     TECHNIQUE: Sequential imaging was performed from the thoracic inlet  through the upper abdomen following the administration of IV contrast.   Sagittal and coronal reformations were made from the original source  data and reviewed. Additionally, 3-D MIPS reconstructions of the vessels  were made per CTA protocol. Automated exposure control was also utilized  to decrease patient radiation dose.     FINDINGS:   The visualized thyroid gland is grossly normal in appearance. The  trachea and main bronchi appear widely patent and in normal anatomic  position. The esophagus is grossly normal in appearance.     No pathologically enlarged axillary, mediastinal, or hilar lymph nodes  are identified.     The heart appears mildly enlarged. There is no pericardial or pleural  effusion. The ascending thoracic aorta and main pulmonary artery appear  normal in caliber. The pulmonary arteries are well opacified, and there  are no filling defects to suggest PE.     There is atelectasis in both lung bases. The lungs otherwise appear  clear.     Review of the visualized portion of the upper abdomen demonstrates no  acute abnormalities.     Review of the visualized osseous structures demonstrates no acute or  aggressive lesions.       Impression:      1. No evidence of PE or acute aortic pathology.     2. Mild  cardiomegaly.  3. Dependent atelectasis in the lung bases.           This report was finalized on 01/09/2021 10:21 by Dr. Tera Saucedo MD.    XR Chest 1 View [[id number]] Collected: 01/09/21 0722     Updated: 01/09/21 0726    Narrative:      EXAMINATION: XR CHEST 1 VW- 1/9/2021 7:22 AM CST     HISTORY: Chest pain     COMPARISON: None     FINDINGS:  The heart and mediastinal contours appear normal. Lungs are clear  without focal consolidation or effusion. No pneumothorax is appreciated.  Pulmonary vasculature appears grossly normal.       Impression:      No evidence of acute cardiopulmonary process.  This report was finalized on 01/09/2021 07:23 by Dr. Tera Saucedo MD.          Orders (all)      Start     Ordered    01/11/21 0000  lisinopril (PRINIVIL,ZESTRIL) 20 MG tablet  Every 24 Hours Scheduled      01/10/21 1227    01/11/21 0000  azithromycin (ZITHROMAX) 250 MG tablet      01/10/21 1227    01/10/21 1224  Discharge patient  Once      01/10/21 1227    01/10/21 1200  perflutren (DEFINITY) lipid microspheres injection 8.476 mg  Once      01/10/21 1111    01/10/21 0900  azithromycin (ZITHROMAX) tablet 250 mg  Every 24 Hours Scheduled,   Status:  Discontinued      01/09/21 1519    01/10/21 0600  Basic Metabolic Panel  Morning Draw      01/09/21 1520    01/10/21 0600  CBC Auto Differential  Morning Draw      01/09/21 1520    01/10/21 0600  Hemoglobin A1c  Morning Draw      01/09/21 1520    01/10/21 0000  guaiFENesin (MUCINEX) 600 MG 12 hr tablet  Every 12 Hours Scheduled      01/10/21 1227    01/10/21 0000  predniSONE (DELTASONE) 10 MG tablet  Daily      01/10/21 1227    01/10/21 0000  albuterol sulfate  (90 Base) MCG/ACT inhaler  Every 4 Hours PRN      01/10/21 1227    01/09/21 2100  sodium chloride 0.9 % flush 10 mL  Every 12 Hours Scheduled,   Status:  Discontinued      01/09/21 1520    01/09/21 1800  Incentive Spirometry  Every 4 Hours While Awake,   Status:  Canceled      01/09/21 1520    01/09/21 1800   predniSONE (DELTASONE) tablet 40 mg  3 Times Daily With Meals,   Status:  Discontinued      01/09/21 1519    01/09/21 1730  sucralfate (CARAFATE) tablet 1 g  4 Times Daily Before Meals & Nightly,   Status:  Discontinued      01/09/21 1519    01/09/21 1630  ipratropium-albuterol (DUO-NEB) nebulizer solution 3 mL  4 Times Daily - RT,   Status:  Discontinued      01/09/21 1519    01/09/21 1615  pantoprazole (PROTONIX) EC tablet 40 mg  Daily,   Status:  Discontinued      01/09/21 1519    01/09/21 1615  enoxaparin (LOVENOX) syringe 40 mg  Every 24 Hours,   Status:  Discontinued      01/09/21 1520    01/09/21 1615  lisinopril (PRINIVIL,ZESTRIL) tablet 20 mg  Every 24 Hours Scheduled,   Status:  Discontinued      01/09/21 1519    01/09/21 1600  Vital Signs  Every 4 Hours,   Status:  Canceled      01/09/21 1520    01/09/21 1554  influenza vac split quad (FLUZONE,FLUARIX,AFLURIA,FLULAVAL) injection 0.5 mL  During Hospitalization,   Status:  Discontinued      01/09/21 1555    01/09/21 1520  Intake & Output  Every Shift,   Status:  Canceled      01/09/21 1520    01/09/21 1520  Weigh Patient  Once      01/09/21 1520    01/09/21 1520  Oxygen Therapy- Nasal Cannula; Titrate for SPO2: 90% - 95%  Continuous,   Status:  Canceled      01/09/21 1520    01/09/21 1520  Insert Peripheral IV  Once      01/09/21 1520    01/09/21 1520  Saline Lock & Maintain IV Access  Continuous,   Status:  Canceled      01/09/21 1520    01/09/21 1520  Telemetry - Maintain IV Access  Continuous,   Status:  Canceled      01/09/21 1520    01/09/21 1520  Continuous Cardiac Monitoring  Continuous,   Status:  Canceled      01/09/21 1520    01/09/21 1520  May Be Off Telemetry for Tests  Continuous,   Status:  Canceled      01/09/21 1520    01/09/21 1520  ACLS Protocol For Life Threatening Dysrhythmias (Unless Code Status Indicates Otherwise)  Continuous,   Status:  Canceled      01/09/21 1520    01/09/21 1520  Notify Provider if ACLS Protocol Activated  Until  Discontinued,   Status:  Canceled      01/09/21 1520    01/09/21 1520  Activity - Ad Haley  Until Discontinued,   Status:  Canceled      01/09/21 1520    01/09/21 1520  Diet Regular; Cardiac  Diet Effective Now,   Status:  Canceled      01/09/21 1520    01/09/21 1520  Adult Transthoracic Echo Complete W/ Cont if Necessary Per Protocol  Once      01/09/21 1519    01/09/21 1520  Troponin  Once      01/09/21 1519    01/09/21 1519  sodium chloride 0.9 % flush 10 mL  As Needed,   Status:  Discontinued      01/09/21 1520    01/09/21 1519  Telemetry - Pulse Oximetry  Continuous PRN,   Status:  Canceled     Comments: If Patient Develops Unresponsiveness, Acute Dyspnea, Cyanosis or Suspected Hypoxemia Start Continuous Pulse Ox Monitoring, Apply Oxygen & Notify Provider    01/09/21 1520    01/09/21 1519  Oxygen Therapy- Nasal Cannula; Titrate for SPO2: 90% - 95%  Continuous PRN,   Status:  Canceled     Comments: If Patient Develops Unresponsiveness, Acute Dyspnea, Cyanosis or Suspected Hypoxemia Start Continuous Pulse Ox Monitoring, Apply Oxygen & Notify Provider    01/09/21 1520    01/09/21 1519  nitroglycerin (NITROSTAT) SL tablet 0.4 mg  Every 5 Minutes PRN,   Status:  Discontinued      01/09/21 1520    01/09/21 1519  ECG 12 Lead  As Needed,   Status:  Canceled     Comments: Nurse to Release if Patient Expericences Acute Chest Pain or Dysrhythmias    01/09/21 1520    01/09/21 1519  Potassium  As Needed,   Status:  Canceled     Comments: For Ventricular Arrhythmias      01/09/21 1520    01/09/21 1519  Magnesium  As Needed,   Status:  Canceled     Comments: For Ventricular Arrhythmias      01/09/21 1520    01/09/21 1519  Troponin  As Needed,   Status:  Canceled     Comments: For Chest Pain      01/09/21 1520    01/09/21 1519  Blood Gas, Arterial -  As Needed,   Status:  Canceled     Comments: Per O2 PolicyNotify Physician      01/09/21 1520    01/09/21 1519  acetaminophen (TYLENOL) tablet 650 mg  Every 4 Hours PRN,   Status:   Discontinued      01/09/21 1520    01/09/21 1519  ondansetron (ZOFRAN) tablet 4 mg  Every 6 Hours PRN,   Status:  Discontinued      01/09/21 1520    01/09/21 1304  guaiFENesin (MUCINEX) 12 hr tablet 1,200 mg  Every 12 Hours Scheduled,   Status:  Discontinued      01/09/21 1302    01/09/21 1243  Code Status and Medical Interventions:  Continuous,   Status:  Canceled      01/09/21 1246    01/09/21 1217  Inpatient Admission  Once      01/09/21 1216    01/09/21 1216  cefTRIAXone (ROCEPHIN) 1 g/100 mL 0.9% NS (MBP)  Once      01/09/21 1214    01/09/21 1216  AZITHROMYCIN 500 MG/250 ML 0.9% NS IVPB (vial-mate)  Once      01/09/21 1214    01/09/21 1006  iopamidol (ISOVUE-370) 76 % injection 100 mL  Once in Imaging      01/09/21 1004    01/09/21 0815  labetalol (NORMODYNE,TRANDATE) injection 10 mg  Once,   Status:  Discontinued      01/09/21 0813    01/09/21 0814  CT Angiogram Chest  1 Time Imaging      01/09/21 0813    01/09/21 0810  Blood Gas, Arterial -  Once      01/09/21 0800    01/09/21 0809  Blood Gas, Arterial -  Once      01/09/21 0808    01/09/21 0754  labetalol (NORMODYNE,TRANDATE) injection 10 mg  Once      01/09/21 0752    01/09/21 0726  CBC Auto Differential  Once      01/09/21 0725    01/09/21 0707  ondansetron (ZOFRAN) injection 4 mg  Once      01/09/21 0705    01/09/21 0652  methylPREDNISolone sodium succinate (SOLU-Medrol) injection 125 mg  Once      01/09/21 0650    01/09/21 0652  ipratropium-albuterol (DUO-NEB) nebulizer solution 3 mL  Once      01/09/21 0650    01/09/21 0652  albuterol (PROVENTIL) nebulizer solution 0.083% 2.5 mg/3mL  Once      01/09/21 0650    01/09/21 0651  C-reactive Protein  STAT      01/09/21 0650    01/09/21 0651  CK  STAT      01/09/21 0650    01/09/21 0650  BNP  Once      01/09/21 0650    01/09/21 0650  D-dimer, Quantitative  Once      01/09/21 0650    01/09/21 0650  Troponin  Now Then Every 2 Hours      01/09/21 0650    01/09/21 0650  ECG 12 Lead  Now & in 2 Hours      01/09/21  0650    01/09/21 0650  Urine Drug Screen - Urine, Clean Catch  Once      01/09/21 0650    01/09/21 0650  hCG, Serum, Qualitative  Once      01/09/21 0650    01/09/21 0650  XR Chest 1 View  1 Time Imaging      01/09/21 0650    01/09/21 0650  COVID PRE-OP / PRE-PROCEDURE SCREENING ORDER (NO ISOLATION) - Swab, Nasal Cavity  Once      01/09/21 0650    01/09/21 0650  COVID-19,[facility] IN-HOUSE,Nasal Swab No Transport Media 3-4 HR TAT - Swab, Nasal Cavity  PROCEDURE ONCE      01/09/21 0650    01/09/21 0649  CBC & Differential  Once      01/09/21 0650    01/09/21 0649  Comprehensive Metabolic Panel  Once      01/09/21 0650    01/09/21 0649  Protime-INR  Once      01/09/21 0650    01/09/21 0649  aPTT  Once      01/09/21 0650    01/09/21 0635  [place] Draw  Once      01/09/21 0635    01/09/21 0635  Light Blue Top  PROCEDURE ONCE      01/09/21 0635    01/09/21 0635  Green Top (Gel)  PROCEDURE ONCE      01/09/21 0635    01/09/21 0635  Lavender Top  PROCEDURE ONCE      01/09/21 0635    01/09/21 0635  Red Top  PROCEDURE ONCE      01/09/21 0635    01/09/21 0632  ECG 12 Lead  Once      01/09/21 0631    --  SCANNED - TELEMETRY        01/09/21 0000    --  SCANNED EKG      01/09/21 0000    --  SCANNED - TELEMETRY        01/09/21 0000    --  SCANNED - LABS      01/09/21 0000    --  SCANNED - TELEMETRY        01/09/21 0000    --  SCANNED EKG      01/09/21 0000                Ventilator/Non-Invasive Ventilation Settings (From admission, onward)    None        Physician Progress Notes (all)    No notes of this type exist for this encounter.         Consult Notes (all)    No notes of this type exist for this encounter.

## 2022-08-23 PROCEDURE — 87635 SARS-COV-2 COVID-19 AMP PRB: CPT | Performed by: FAMILY MEDICINE

## 2023-02-14 ENCOUNTER — APPOINTMENT (OUTPATIENT)
Dept: GENERAL RADIOLOGY | Facility: HOSPITAL | Age: 43
End: 2023-02-14
Payer: COMMERCIAL

## 2023-02-14 ENCOUNTER — HOSPITAL ENCOUNTER (EMERGENCY)
Facility: HOSPITAL | Age: 43
Discharge: HOME | End: 2023-02-15
Admitting: STUDENT IN AN ORGANIZED HEALTH CARE EDUCATION/TRAINING PROGRAM
Payer: COMMERCIAL

## 2023-02-14 ENCOUNTER — APPOINTMENT (OUTPATIENT)
Dept: CT IMAGING | Facility: HOSPITAL | Age: 43
End: 2023-02-14
Payer: COMMERCIAL
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
## 2023-02-14 DIAGNOSIS — M54.50 ACUTE MIDLINE LOW BACK PAIN, UNSPECIFIED WHETHER SCIATICA PRESENT: ICD-10-CM

## 2023-02-14 LAB
ALBUMIN SERPL-MCNC: 4.1 G/DL (ref 3.5–5.2)
ALBUMIN/GLOB SERPL: 1.5 G/DL
ALP SERPL-CCNC: 104 U/L (ref 39–117)
ALT SERPL W P-5'-P-CCNC: 18 U/L (ref 1–33)
ANION GAP SERPL CALCULATED.3IONS-SCNC: 8 MMOL/L (ref 5–15)
AST SERPL-CCNC: 26 U/L (ref 1–32)
BASOPHILS # BLD AUTO: 0.1 10*3/MM3 (ref 0–0.2)
BASOPHILS NFR BLD AUTO: 1.1 % (ref 0–1.5)
BILIRUB SERPL-MCNC: 0.3 MG/DL (ref 0–1.2)
BUN SERPL-MCNC: 10 MG/DL (ref 6–20)
BUN/CREAT SERPL: 14.7 (ref 7–25)
CALCIUM SPEC-SCNC: 8.7 MG/DL (ref 8.6–10.5)
CHLORIDE SERPL-SCNC: 109 MMOL/L (ref 98–107)
CO2 SERPL-SCNC: 27 MMOL/L (ref 22–29)
CREAT SERPL-MCNC: 0.68 MG/DL (ref 0.57–1)
DEPRECATED RDW RBC AUTO: 43.6 FL (ref 37–54)
EGFRCR SERPLBLD CKD-EPI 2021: 111.7 ML/MIN/1.73
EOSINOPHIL # BLD AUTO: 0.08 10*3/MM3 (ref 0–0.4)
EOSINOPHIL NFR BLD AUTO: 0.9 % (ref 0.3–6.2)
ERYTHROCYTE [DISTWIDTH] IN BLOOD BY AUTOMATED COUNT: 12.7 % (ref 12.3–15.4)
GLOBULIN UR ELPH-MCNC: 2.7 GM/DL
GLUCOSE SERPL-MCNC: 95 MG/DL (ref 65–99)
HCT VFR BLD AUTO: 39 % (ref 34–46.6)
HGB BLD-MCNC: 12.2 G/DL (ref 12–15.9)
IMM GRANULOCYTES # BLD AUTO: 0.03 10*3/MM3 (ref 0–0.05)
IMM GRANULOCYTES NFR BLD AUTO: 0.3 % (ref 0–0.5)
LYMPHOCYTES # BLD AUTO: 3.03 10*3/MM3 (ref 0.7–3.1)
LYMPHOCYTES NFR BLD AUTO: 33.7 % (ref 19.6–45.3)
MCH RBC QN AUTO: 29.2 PG (ref 26.6–33)
MCHC RBC AUTO-ENTMCNC: 31.3 G/DL (ref 31.5–35.7)
MCV RBC AUTO: 93.3 FL (ref 79–97)
MONOCYTES # BLD AUTO: 0.91 10*3/MM3 (ref 0.1–0.9)
MONOCYTES NFR BLD AUTO: 10.1 % (ref 5–12)
NEUTROPHILS NFR BLD AUTO: 4.83 10*3/MM3 (ref 1.7–7)
NEUTROPHILS NFR BLD AUTO: 53.9 % (ref 42.7–76)
NRBC BLD AUTO-RTO: 0 /100 WBC (ref 0–0.2)
PLATELET # BLD AUTO: 296 10*3/MM3 (ref 140–450)
PMV BLD AUTO: 10.7 FL (ref 6–12)
POTASSIUM SERPL-SCNC: 4.1 MMOL/L (ref 3.5–5.2)
PROT SERPL-MCNC: 6.8 G/DL (ref 6–8.5)
RBC # BLD AUTO: 4.18 10*6/MM3 (ref 3.77–5.28)
SODIUM SERPL-SCNC: 144 MMOL/L (ref 136–145)
WBC NRBC COR # BLD: 8.98 10*3/MM3 (ref 3.4–10.8)

## 2023-02-14 PROCEDURE — 72125 CT NECK SPINE W/O DYE: CPT

## 2023-02-14 PROCEDURE — 99284 EMERGENCY DEPT VISIT MOD MDM: CPT

## 2023-02-14 PROCEDURE — 85025 COMPLETE CBC W/AUTO DIFF WBC: CPT

## 2023-02-14 PROCEDURE — 70450 CT HEAD/BRAIN W/O DYE: CPT

## 2023-02-14 PROCEDURE — 80053 COMPREHEN METABOLIC PANEL: CPT

## 2023-02-14 PROCEDURE — 72131 CT LUMBAR SPINE W/O DYE: CPT

## 2023-02-14 PROCEDURE — 73552 X-RAY EXAM OF FEMUR 2/>: CPT

## 2023-02-14 RX ORDER — HYDROCODONE BITARTRATE AND ACETAMINOPHEN 5; 325 MG/1; MG/1
1 TABLET ORAL ONCE
Status: COMPLETED | OUTPATIENT
Start: 2023-02-14 | End: 2023-02-14

## 2023-02-14 RX ADMIN — HYDROCODONE BITARTRATE AND ACETAMINOPHEN 1 TABLET: 5; 325 TABLET ORAL at 23:08

## 2023-02-15 VITALS
SYSTOLIC BLOOD PRESSURE: 146 MMHG | HEIGHT: 64 IN | BODY MASS INDEX: 44.73 KG/M2 | OXYGEN SATURATION: 95 % | WEIGHT: 262 LBS | RESPIRATION RATE: 20 BRPM | DIASTOLIC BLOOD PRESSURE: 90 MMHG | TEMPERATURE: 97.8 F | HEART RATE: 82 BPM

## 2023-02-15 PROCEDURE — 96372 THER/PROPH/DIAG INJ SC/IM: CPT

## 2023-02-15 PROCEDURE — 25010000002 ORPHENADRINE CITRATE PER 60 MG

## 2023-02-15 RX ORDER — ORPHENADRINE CITRATE 30 MG/ML
60 INJECTION INTRAMUSCULAR; INTRAVENOUS ONCE
Status: COMPLETED | OUTPATIENT
Start: 2023-02-15 | End: 2023-02-15

## 2023-02-15 RX ADMIN — ORPHENADRINE CITRATE 60 MG: 30 INJECTION INTRAMUSCULAR; INTRAVENOUS at 00:38

## 2023-02-15 NOTE — ED PROVIDER NOTES
Subjective   History of Present Illness  Patient is a 42-year-old female who presents to the ED today via EMS for a reported fall, patient reports she fell backwards off of a ladder.  Reports the ladder is about her height and she fell from the top rung, she denies any LOC or head injury, there is no obvious wound to the head noted.  She denies neck pain but is reporting lumbar spine pain on exam. She is also reporting right leg pain in the mid-shaft area of the femur. She denies any fever, systemic symptoms, saddle anesthesia, urinary/fecal incontinence/retention, there are no focal neurodeficits, she denies any history of cancer, and there is no long-term steroid use or recent injections.  She denies any blurred vision or other visual changes.  She presents in a cervical collar, this has been maintained.  She denies any abdominal pain or chest pain.  History significant for GERD, hypertension, shoulder dislocation, and TMJ.  Differential diagnoses: spinal fracture, muscle strain, femur fracture, and other.     History provided by:  Patient   used: No        Review of Systems   Constitutional: Negative for chills, diaphoresis, fatigue and fever.   HENT: Negative for dental problem, ear discharge, ear pain, facial swelling and nosebleeds.    Eyes: Negative for photophobia and visual disturbance.   Respiratory: Negative for cough, shortness of breath and wheezing.    Cardiovascular: Negative for chest pain, palpitations and leg swelling.   Gastrointestinal: Negative for abdominal distention, abdominal pain, nausea and vomiting.   Genitourinary: Negative for dysuria, flank pain and hematuria.   Musculoskeletal: Positive for arthralgias and back pain. Negative for gait problem and neck pain.   Skin: Negative.    Neurological: Negative for dizziness, syncope, weakness, numbness and headaches.   Psychiatric/Behavioral: Negative.  Negative for confusion.       Past Medical History:   Diagnosis Date   •  GERD (gastroesophageal reflux disease)    • Hypertension    • Shoulder dislocation    • TMJ (temporomandibular joint syndrome)        Allergies   Allergen Reactions   • Definity [Perflutren Lipid Microsphere] Other (See Comments)     Severe lower back pain       Past Surgical History:   Procedure Laterality Date   • ENDOSCOPY N/A 1/22/2019    Procedure: ESOPHAGOGASTRODUODENOSCOPY WITH ANESTHESIA;  Surgeon: Khari Ramirez DO;  Location: [facility] ENDOSCOPY;  Service: Gastroenterology   • HYSTERECTOMY         Family History   Problem Relation Age of Onset   • Colon cancer Neg Hx    • Colon polyps Neg Hx    • Esophageal cancer Neg Hx    • Breast cancer Neg Hx        Social History     Socioeconomic History   • Marital status:    Tobacco Use   • Smoking status: Some Days     Packs/day: 0.50     Years: 5.00     Pack years: 2.50     Types: Cigarettes   • Smokeless tobacco: Never   Substance and Sexual Activity   • Alcohol use: No   • Drug use: No           Objective   Physical Exam  Vitals and nursing note reviewed.   Constitutional:       General: She is not in acute distress.     Appearance: Normal appearance. She is not ill-appearing, toxic-appearing or diaphoretic.   HENT:      Head: Normocephalic and atraumatic.      Right Ear: External ear normal.      Left Ear: External ear normal.      Nose: Nose normal.   Eyes:      Extraocular Movements: Extraocular movements intact.      Conjunctiva/sclera: Conjunctivae normal.      Pupils: Pupils are equal, round, and reactive to light.   Cardiovascular:      Rate and Rhythm: Normal rate and regular rhythm.      Pulses: Normal pulses.      Heart sounds: Normal heart sounds.   Pulmonary:      Effort: Pulmonary effort is normal. No respiratory distress.      Breath sounds: Normal breath sounds. No stridor. No wheezing, rhonchi or rales.   Abdominal:      General: Bowel sounds are normal. There is no distension.      Palpations: Abdomen is soft.      Tenderness: There is  no abdominal tenderness. There is no guarding or rebound.   Musculoskeletal:      Cervical back: Normal range of motion and neck supple. No rigidity, tenderness or crepitus. No pain with movement. Normal range of motion.      Thoracic back: Normal.      Lumbar back: Tenderness and bony tenderness present. No swelling, deformity or lacerations. Normal range of motion.        Legs:    Skin:     General: Skin is warm and dry.      Capillary Refill: Capillary refill takes less than 2 seconds.   Neurological:      Mental Status: She is alert and oriented to person, place, and time. Mental status is at baseline.      GCS: GCS eye subscore is 4. GCS verbal subscore is 5. GCS motor subscore is 6.      Sensory: Sensation is intact. No sensory deficit.      Motor: Motor function is intact. No weakness.      Coordination: Coordination is intact. Coordination normal.      Gait: Gait is intact. Gait normal.   Psychiatric:         Mood and Affect: Mood normal.         Behavior: Behavior normal.         Thought Content: Thought content normal.         Judgment: Judgment normal.       Labs Reviewed   COMPREHENSIVE METABOLIC PANEL - Abnormal; Notable for the following components:       Result Value    Chloride 109 (*)     All other components within normal limits    Narrative:     GFR Normal >60  Chronic Kidney Disease <60  Kidney Failure <15     CBC WITH AUTO DIFFERENTIAL - Abnormal; Notable for the following components:    MCHC 31.3 (*)     Monocytes, Absolute 0.91 (*)     All other components within normal limits   CBC AND DIFFERENTIAL    Narrative:     The following orders were created for panel order CBC & Differential.  Procedure                               Abnormality         Status                     ---------                               -----------         ------                     CBC Auto Differential[[id number]]        Abnormal            Final result                 Please view results for these tests on the  individual orders.       Procedures           ED Course  ED Course as of 02/15/23 1201   Tue Feb 14, 2023 2141 Nursing staff is informing that the patient has had a prior hysterectomy. [HE]   2240 Nursing staff is informing that the patient is requesting pain medication.  I have presented to the bedside, patient reports she will have a ride home if she is to be discharged home. [HE]   2327 Stat rad has interpreted CT scans.    CT L-spine shows no acute fracture, misalignment or acute abnormality in the lumbar spine.  Mild levoscoliosis.  CT C-spine shows no fracture, misalignment or other acute bony abnormality.  Multilevel degenerative disc disease.  No spinal stenosis.  CT head reveals no acute or focal intracranial abnormality, no fracture. [HE]   2341 Blood pressure has improved to 142/80. [HE]   2347 XR femur has been read independently by Dr. Bhakta at this time, reports no acute findings, no fracture noted.  [HE]      ED Course User Index  [HE] , SHIVAM Malik                                           Medical Decision Making  Patient is a 42-year-old female who presents to the ED today via EMS for a reported fall, patient reports she fell backwards off of a ladder.  Reports the ladder is about her height and she fell from the top rung, she denies any LOC or head injury, there is no obvious wound to the head noted.  She denies neck pain but is reporting lumbar spine pain on exam. She is also reporting right leg pain in the mid-shaft area of the femur. She denies any fever, systemic symptoms, saddle anesthesia, urinary/fecal incontinence/retention, there are no focal neurodeficits, she denies any history of cancer, and there is no long-term steroid use or recent injections.  She denies any blurred vision or other visual changes.  She presents in a cervical collar, this has been maintained.  She denies any abdominal pain or chest pain.  History significant for GERD, hypertension, shoulder dislocation,  and TMJ.  Differential diagnoses: spinal fracture, muscle strain, femur fracture, and other.       NIH is 0.  CBC and CMP unremarkable.   Pt has had hysterectomy previously, nursing staff have dc'd POC pregnancy test.   XR femur was read independently by Dr. Bhakta as unremarkable.   CT head, C spine, and L spine reveal no acute findings, chronic degenerative changes noted.   Pt ambulatory in the ED, gait intact.  Pt was given Norflex and Norco for pain control, on re-eval reports improved pain level.   I discussed her results with her at the bedside. We discussed that she should follow up with her PCP in the next 2-3 days and return to the ED with any new, worsening, or persistent symptoms. She can take tylenol and ibuprofen as needed for pain. She voiced understanding and agrees with plan of care. VS reassuring. I discussed the pt's case with Dr. Bhakta who agrees pt is safe for discharge.     Acute midline low back pain, unspecified whether sciatica present: acute illness or injury  Fall, initial encounter: acute illness or injury  Amount and/or Complexity of Data Reviewed  Labs: ordered.  Radiology: ordered.      Risk  Prescription drug management.          Final diagnoses:   Fall, initial encounter   Acute midline low back pain, unspecified whether sciatica present       ED Disposition  ED Disposition     ED Disposition   Discharge    Condition   Stable    Comment   --             Manuel Izaguirre, APRN  [address] [place] [id number] [phone]    In 2 days           Medication List      No changes were made to your prescriptions during this visit.          Kathy Aleman, APRN  02/15/23 1200

## 2023-02-15 NOTE — DISCHARGE INSTRUCTIONS
Your CT scans are normal today, your x-ray of your leg and your lab work-up is also normal.  You have been given a pain pill as well as an injection of muscle relaxer.  You are to continue taking your prescribed tramadol and follow-up with your PCP in the next 2 to 3 days.  Please return to the ED with any new, worsening, or persistent symptoms.

## 2024-09-20 ENCOUNTER — APPOINTMENT (OUTPATIENT)
Dept: GENERAL RADIOLOGY | Facility: HOSPITAL | Age: 44
End: 2024-09-20
Payer: COMMERCIAL

## 2024-09-20 ENCOUNTER — HOSPITAL ENCOUNTER (EMERGENCY)
Facility: HOSPITAL | Age: 44
Discharge: HOME | End: 2024-09-20
Attending: FAMILY MEDICINE
Payer: COMMERCIAL

## 2024-09-20 VITALS
HEART RATE: 63 BPM | RESPIRATION RATE: 16 BRPM | OXYGEN SATURATION: 97 % | WEIGHT: 218.4 LBS | SYSTOLIC BLOOD PRESSURE: 145 MMHG | DIASTOLIC BLOOD PRESSURE: 99 MMHG | BODY MASS INDEX: 37.28 KG/M2 | HEIGHT: 64 IN | TEMPERATURE: 98.2 F

## 2024-09-20 DIAGNOSIS — S20.211A RIB CONTUSION, RIGHT, INITIAL ENCOUNTER: Primary | ICD-10-CM

## 2024-09-20 PROCEDURE — 99283 EMERGENCY DEPT VISIT LOW MDM: CPT

## 2024-09-20 PROCEDURE — 71101 X-RAY EXAM UNILAT RIBS/CHEST: CPT

## 2024-09-20 RX ORDER — BACLOFEN 10 MG/1
10 TABLET ORAL 3 TIMES DAILY PRN
COMMUNITY
Start: 2024-08-22

## 2024-09-20 RX ORDER — ROPINIROLE 0.25 MG/1
0.25 TABLET, FILM COATED ORAL NIGHTLY
COMMUNITY
Start: 2024-08-22

## 2024-09-20 RX ORDER — LISINOPRIL 20 MG/1
1 TABLET ORAL DAILY
COMMUNITY
Start: 2024-08-22

## 2024-09-20 RX ORDER — IBUPROFEN 800 MG/1
800 TABLET, FILM COATED ORAL 2 TIMES DAILY PRN
COMMUNITY
Start: 2024-08-22

## 2025-03-23 NOTE — PROGRESS NOTES
[facility]  [facility]  [address]  [facility] [id number] [phone]     Patient: Gabriele Brand   YOB: 1980   Date: 3/26/2025     No chief complaint on file.       History of Present Illness  Gabriele is a right hand dominant 44 y.o. female who presents today for initial evaluation by our office with reports of bilateral hand pain with numbness and tingling that has gradually worsened over the course of approximately several years.  Patient reports a burning-like sensation at times.  He has previously treated this conservatively with activity modification, oral over-the-counter anti-inflammatories, and bilateral wrist brace with minimal improvement.  Patient reports that numbness and tingling is to all digits of bilateral hands and occasionally travels down bilateral arms.  Patient reports that she does have weakness to  bilaterally.  She also reports nocturnal symptoms.      Past Medical History:   Diagnosis Date    Arthritis     Hypertension     Labral tear of shoulder     fall from back porch      Past Surgical History:   Procedure Laterality Date    CAPSULORRHAPHY SHOULDER Right 6/26/2017    SHOULDER CAPSULORRHAPHY BURSECTOMY performed by Heriberto Cueto MD at [facility] OR    ENDOMETRIAL ABLATION      HYSTERECTOMY      SHOULDER ARTHROSCOPY Right 6/26/2017    RIGHT SHOULDER ARHTROSCOPIC BANKART REPAIR performed by Heriberto Cueto MD at [facility] OR      Social History     Socioeconomic History    Marital status:    Tobacco Use    Smoking status: Every Day     Current packs/day: 1.00     Average packs/day: 1 pack/day for 15.0 years (15.0 ttl pk-yrs)     Types: Cigarettes   Substance and Sexual Activity    Alcohol use: No    Drug use: No     Social Drivers of Health      Received from [facility]    Family and Community Support   Intimate Partner Violence: Not At Risk (9/20/2024)    Received from [facility]

## 2025-03-26 ENCOUNTER — APPOINTMENT (OUTPATIENT)
Dept: CT IMAGING | Facility: HOSPITAL | Age: 45
End: 2025-03-26
Payer: COMMERCIAL

## 2025-03-26 ENCOUNTER — HOSPITAL ENCOUNTER (EMERGENCY)
Facility: HOSPITAL | Age: 45
Discharge: HOME | End: 2025-03-26
Payer: COMMERCIAL

## 2025-03-26 ENCOUNTER — OFFICE VISIT (OUTPATIENT)
Age: 45
End: 2025-03-26
Payer: MEDICAID

## 2025-03-26 VITALS — WEIGHT: 204.2 LBS | HEIGHT: 66 IN | BODY MASS INDEX: 32.82 KG/M2

## 2025-03-26 VITALS
SYSTOLIC BLOOD PRESSURE: 165 MMHG | DIASTOLIC BLOOD PRESSURE: 90 MMHG | HEART RATE: 65 BPM | BODY MASS INDEX: 33.99 KG/M2 | RESPIRATION RATE: 19 BRPM | WEIGHT: 204 LBS | OXYGEN SATURATION: 95 % | TEMPERATURE: 98.3 F | HEIGHT: 65 IN

## 2025-03-26 DIAGNOSIS — R20.2 NUMBNESS AND TINGLING IN BOTH HANDS: Primary | ICD-10-CM

## 2025-03-26 DIAGNOSIS — R20.0 NUMBNESS AND TINGLING IN BOTH HANDS: Primary | ICD-10-CM

## 2025-03-26 DIAGNOSIS — S01.01XA LACERATION OF SCALP, INITIAL ENCOUNTER: Primary | ICD-10-CM

## 2025-03-26 PROCEDURE — 63710000001 ONDANSETRON ODT 4 MG TABLET DISPERSIBLE: Performed by: NURSE PRACTITIONER

## 2025-03-26 PROCEDURE — 90715 TDAP VACCINE 7 YRS/> IM: CPT | Performed by: NURSE PRACTITIONER

## 2025-03-26 PROCEDURE — 25010000002 TETANUS-DIPHTH-ACELL PERTUSSIS 5-2.5-18.5 LF-MCG/0.5 SUSPENSION PREFILLED SYRINGE: Performed by: NURSE PRACTITIONER

## 2025-03-26 PROCEDURE — 99284 EMERGENCY DEPT VISIT MOD MDM: CPT

## 2025-03-26 PROCEDURE — 25010000002 LIDOCAINE-EPINEPHRINE 2 %-1:100000 SOLUTION: Performed by: NURSE PRACTITIONER

## 2025-03-26 PROCEDURE — 90471 IMMUNIZATION ADMIN: CPT | Performed by: NURSE PRACTITIONER

## 2025-03-26 PROCEDURE — 99204 OFFICE O/P NEW MOD 45 MIN: CPT | Performed by: NURSE PRACTITIONER

## 2025-03-26 PROCEDURE — 70450 CT HEAD/BRAIN W/O DYE: CPT

## 2025-03-26 RX ORDER — MELATONIN 10 MG
10 CAPSULE ORAL NIGHTLY
COMMUNITY
Start: 2025-02-03

## 2025-03-26 RX ORDER — HYDROCODONE BITARTRATE AND ACETAMINOPHEN 7.5; 325 MG/1; MG/1
1 TABLET ORAL ONCE
Refills: 0 | Status: COMPLETED | OUTPATIENT
Start: 2025-03-26 | End: 2025-03-26

## 2025-03-26 RX ORDER — LIDOCAINE HYDROCHLORIDE AND EPINEPHRINE BITARTRATE 20; .01 MG/ML; MG/ML
10 INJECTION, SOLUTION SUBCUTANEOUS ONCE
Status: COMPLETED | OUTPATIENT
Start: 2025-03-26 | End: 2025-03-26

## 2025-03-26 RX ORDER — ONDANSETRON 4 MG/1
4 TABLET, ORALLY DISINTEGRATING ORAL ONCE
Status: COMPLETED | OUTPATIENT
Start: 2025-03-26 | End: 2025-03-26

## 2025-03-26 RX ADMIN — ONDANSETRON 4 MG: 4 TABLET, ORALLY DISINTEGRATING ORAL at 15:58

## 2025-03-26 RX ADMIN — HYDROCODONE BITARTRATE AND ACETAMINOPHEN 1 TABLET: 7.5; 325 TABLET ORAL at 15:58

## 2025-03-26 RX ADMIN — TETANUS TOXOID, REDUCED DIPHTHERIA TOXOID AND ACELLULAR PERTUSSIS VACCINE, ADSORBED 0.5 ML: 5; 2.5; 8; 8; 2.5 SUSPENSION INTRAMUSCULAR at 15:56

## 2025-03-26 RX ADMIN — LIDOCAINE HYDROCHLORIDE,EPINEPHRINE BITARTRATE 10 ML: 20; .01 INJECTION, SOLUTION INFILTRATION; PERINEURAL at 16:22

## 2025-03-26 NOTE — DISCHARGE INSTRUCTIONS
Avoid any peroxide or alcohol to the wound  Return and 7 days for staple removal    You may take your Ultram that is prescribed by your PCP as needed for pain and/or over-the-counter Motrin as directed

## 2025-03-26 NOTE — ED PROVIDER NOTES
Subjective   History of Present Illness  Patient is a 44-year-old female who presents to the ER via EMS due to laceration.  Patient was moving wood panels that were lying next to her shed.  She states one of the panels had a nail sticking out and fell back and hit her on the head causing a laceration.  She denies any loss of consciousness.  She denies any neck pain.  She is not up-to-date on tetanus.  She presents for laceration repair and further evaluation.  Past medical history significant for GERD, hypertension, shoulder dislocation, TMJ    Blood pressure is elevated in the ER, admits she did not take her blood pressure medication today        Review of Systems   Constitutional: Negative.    HENT: Negative.     Eyes: Negative.    Respiratory: Negative.     Cardiovascular: Negative.    Gastrointestinal: Negative.    Endocrine: Negative.    Genitourinary: Negative.    Musculoskeletal: Negative.    Skin:  Positive for wound.   Allergic/Immunologic: Negative.    Neurological: Negative.    Hematological: Negative.    Psychiatric/Behavioral: Negative.     All other systems reviewed and are negative.      Past Medical History:   Diagnosis Date    GERD (gastroesophageal reflux disease)     Hypertension     Shoulder dislocation     TMJ (temporomandibular joint syndrome)        Allergies   Allergen Reactions    Definity [Perflutren Lipid Microsphere] Other (See Comments)     Severe lower back pain    Ct Contrast Other (See Comments)     PT STATES IT MAKES WHOLE BODY HURT        Past Surgical History:   Procedure Laterality Date    ENDOSCOPY N/A 1/22/2019    Procedure: ESOPHAGOGASTRODUODENOSCOPY WITH ANESTHESIA;  Surgeon: Khari Ramirez DO;  Location: [facility] ENDOSCOPY;  Service: Gastroenterology    HYSTERECTOMY         Family History   Problem Relation Age of Onset    Colon cancer Neg Hx     Colon polyps Neg Hx     Esophageal cancer Neg Hx     Breast cancer Neg Hx        Social History     Socioeconomic History     Marital status:    Tobacco Use    Smoking status: Some Days     Current packs/day: 0.50     Average packs/day: 0.5 packs/day for 5.0 years (2.5 ttl pk-yrs)     Types: Cigarettes    Smokeless tobacco: Never   Substance and Sexual Activity    Alcohol use: No    Drug use: No           Objective   Physical Exam  Vitals and nursing note reviewed.   Constitutional:       Appearance: She is well-developed.   HENT:      Head: Normocephalic.      Nose: Nose normal.   Eyes:      Conjunctiva/sclera: Conjunctivae normal.      Pupils: Pupils are equal, round, and reactive to light.   Cardiovascular:      Rate and Rhythm: Normal rate and regular rhythm.      Heart sounds: Normal heart sounds.   Pulmonary:      Effort: Pulmonary effort is normal.      Breath sounds: Normal breath sounds.   Abdominal:      General: Bowel sounds are normal.      Palpations: Abdomen is soft.   Musculoskeletal:         General: Normal range of motion.      Cervical back: Normal range of motion and neck supple.   Skin:     General: Skin is warm and dry.      Comments: 2 cm laceration to the left side of the forehead/scalp with bleeding controlled   Neurological:      Mental Status: She is alert and oriented to person, place, and time.   Psychiatric:         Mood and Affect: Mood normal.         Behavior: Behavior normal.         Thought Content: Thought content normal.         Judgment: Judgment normal.         Laceration Repair    Date/Time: 3/26/2025 4:36 PM    Performed by: Catherine Fu APRN  Authorized by: Catherine Fu APRN    Consent:     Consent obtained:  Verbal    Consent given by:  Patient    Risks discussed:  Pain  Laceration details:     Location:  Scalp    Scalp location:  Frontal    Length (cm):  2  Pre-procedure details:     Preparation:  Patient was prepped and draped in usual sterile fashion  Treatment:     Area cleansed with:  Chlorhexidine and saline    Amount of cleaning:  Standard    Irrigation solution:   Sterile saline    Irrigation method:  Syringe  Skin repair:     Repair method:  Staples    Number of staples:  4  Approximation:     Approximation:  Close  Repair type:     Repair type:  Simple             ED Course                                                       Medical Decision Making  Patient is a 44-year-old female who presents to the ER via EMS due to laceration.  Patient was moving wood panels that were lying next to her shed.  She states one of the panels had a nail sticking out and fell back and hit her on the head causing a laceration.  She denies any loss of consciousness.  She denies any neck pain.  She is not up-to-date on tetanus.  She presents for laceration repair and further evaluation.  Past medical history significant for GERD, hypertension, shoulder dislocation, TMJ    Blood pressure is elevated in the ER, admits she did not take her blood pressure medication today  Differential diagnosis includes but not limited to laceration, skull fracture, intracranial hemorrhage, concussion, and other etiologies    Amount and/or Complexity of Data Reviewed  Radiology: ordered.    Risk  Prescription drug management.      CT Head Without Contrast   Final Result   No acute intracranial abnormality. No radiopaque foreign bodies   identified within the scalp. No skull fracture.       This report was signed and finalized on 3/26/2025 4:05 PM by Dr. Krupa Atwood MD.          CT scan is negative for acute findings.  Patient's laceration was repaired with staples.  She will need to return and 7 days for staple removal.  She will be discharged home shortly in stable condition.    Final diagnoses:   Laceration of scalp, initial encounter       ED Disposition  ED Disposition       ED Disposition   Discharge    Condition   Good    Comment   --               No follow-up provider specified.       Medication List      No changes were made to your prescriptions during this visit.            Catherine Fu,  APRN  03/26/25 1635

## 2025-04-02 ENCOUNTER — HOSPITAL ENCOUNTER (EMERGENCY)
Facility: HOSPITAL | Age: 45
Discharge: HOME | End: 2025-04-02
Payer: COMMERCIAL

## 2025-04-02 VITALS
OXYGEN SATURATION: 97 % | SYSTOLIC BLOOD PRESSURE: 160 MMHG | HEIGHT: 64 IN | HEART RATE: 73 BPM | RESPIRATION RATE: 16 BRPM | WEIGHT: 201 LBS | DIASTOLIC BLOOD PRESSURE: 102 MMHG | BODY MASS INDEX: 34.31 KG/M2 | TEMPERATURE: 97.7 F

## 2025-04-02 DIAGNOSIS — Z48.02 ENCOUNTER FOR STAPLE REMOVAL: Primary | ICD-10-CM

## 2025-04-02 PROCEDURE — 99202 OFFICE O/P NEW SF 15 MIN: CPT

## 2025-04-02 NOTE — ED PROVIDER NOTES
Subjective   History of Present Illness  Patient is a 44-year-old female who presents to the ER for staple removal.  Patient was seen on 3/26/2025 after sustaining a laceration to the front of her scalp on the left side.  4 staples were placed.  Laceration is approximated and ready for staple removal.  No signs of infection noted.        Review of Systems   All other systems reviewed and are negative.      Past Medical History:   Diagnosis Date    GERD (gastroesophageal reflux disease)     Hypertension     Shoulder dislocation     TMJ (temporomandibular joint syndrome)        Allergies   Allergen Reactions    Definity [Perflutren Lipid Microsphere] Other (See Comments)     Severe lower back pain    Ct Contrast Other (See Comments)     PT STATES IT MAKES WHOLE BODY HURT        Past Surgical History:   Procedure Laterality Date    ENDOSCOPY N/A 1/22/2019    Procedure: ESOPHAGOGASTRODUODENOSCOPY WITH ANESTHESIA;  Surgeon: Khari Ramirez DO;  Location: [facility] ENDOSCOPY;  Service: Gastroenterology    HYSTERECTOMY         Family History   Problem Relation Age of Onset    Colon cancer Neg Hx     Colon polyps Neg Hx     Esophageal cancer Neg Hx     Breast cancer Neg Hx        Social History     Socioeconomic History    Marital status:    Tobacco Use    Smoking status: Some Days     Current packs/day: 0.50     Average packs/day: 0.5 packs/day for 5.0 years (2.5 ttl pk-yrs)     Types: Cigarettes    Smokeless tobacco: Never   Substance and Sexual Activity    Alcohol use: No    Drug use: No           Objective   Physical Exam  Vitals and nursing note reviewed.   Constitutional:       General: She is not in acute distress.     Appearance: Normal appearance. She is normal weight. She is not ill-appearing or toxic-appearing.   HENT:      Head: Normocephalic.        Comments: No signs of infection noted  Musculoskeletal:         General: Normal range of motion.   Skin:     General: Skin is warm and dry.   Neurological:       General: No focal deficit present.      Mental Status: She is alert and oriented to person, place, and time. Mental status is at baseline.   Psychiatric:         Mood and Affect: Mood normal.         Procedures           ED Course                                                       Medical Decision Making  Patient is a 44-year-old female who presents to the ER for staple removal.  Patient was seen on 3/26/2025 after sustaining a laceration to the front of her scalp on the left side.  4 staples were placed.  Laceration is approximated and ready for staple removal.  No signs of infection noted.    Staples were removed successfully in the ER.  On exam, patient had no signs of infection noted.  Advised return to the ER for any new or worsening symptoms.  Patient discharged in stable condition.    Problems Addressed:  Encounter for staple removal: acute illness or injury        Final diagnoses:   Encounter for staple removal       ED Disposition  ED Disposition       ED Disposition   Discharge    Condition   Stable    Comment   --               Manuel Izaguirre, APRN  [address]  [facility] [address]  [phone]    Schedule an appointment as soon as possible for a visit in 1 day      [facility] EMERGENCY DEPARTMENT  [address] [place] [id number] [phone]  Go to   If symptoms worsen         Medication List      No changes were made to your prescriptions during this visit.            Desirae Montano APRN  04/02/25 9757

## 2025-04-30 ENCOUNTER — HOSPITAL ENCOUNTER (OUTPATIENT)
Dept: NEUROLOGY | Age: 45
Discharge: HOME | End: 2025-04-30
Payer: MEDICAID

## 2025-04-30 PROCEDURE — 95911 NRV CNDJ TEST 9-10 STUDIES: CPT

## 2025-04-30 PROCEDURE — 95886 MUSC TEST DONE W/N TEST COMP: CPT

## 2025-05-01 NOTE — PROCEDURES
[facility]  Neurophysiology Department  [address] [place]  [address]  Phone [phone]  Fax [phone]     NEUROPHYSIOLOGY REPORT  Patient Data  Patient Name Gabriele Brand Referring Provider Violet HERNANDEZ   Account Number [id number] Interpreting physician Joon Huber M.D.    1980 Technologist Carol Roy   Age 45 Test Nerve conduction studies/electromyogram   Indications for the test carpal tunnel syndrome Date of test 2025       HISTORY:     Gabriele Brand is a 45 year old woman who complains of pain, numbness, and tingling in the bilateral hands.      SUMMARY:     Nerve conduction studies of the bilateral upper extremities showed prolonged median sensory distal latencies and relatively prolonged median motor distal latencies.     Electromyogram of the bilateral upper extremities showed large motor unit potentials with reduced recruitment in the abductor pollicis brevis muscles and in C7 innervated muscles bilaterally.      INTERPRETATION:     The findings are those of moderate median neuropathies at the wrists (carpal tunnel syndrome) and chronic bilateral C7 radiculopathies.                          Joon Huber M.D.      Sensory NCS      Nerve / Sites Rec. Site Onset Lat Peak Lat NP Amp PP Amp Segments Distance Peak Diff Velocity     ms ms µV µV  cm ms m/s   L Median, Ulnar - Transcarpal comparison      Median Palm Wrist 1.8 2.4 50.1 45.6 Median Palm - Wrist 8  46      Ref.   <=2.2 >=50.0  Ref.         Ulnar Palm Wrist 1.1 1.6 9.9 12.4 Ulnar Palm - Wrist 8  70      Ref.   <=2.2 >=15.0  Ref.            Median Palm - Ulnar Palm  0.9          Ref.  <=0.3    R Median, Ulnar - Transcarpal comparison      Median Palm Wrist 2.0 2.9 27.6 17.8 Median Palm - Wrist 8  41      Ref.   <=2.2 >=50.0  Ref.         Ulnar Palm Wrist 1.5 2.2 20.5 15.7 Ulnar Palm - Wrist 8  54      Ref.   <=2.2 >=15.0  Ref.            Median Palm - Ulnar Palm

## 2025-05-07 ENCOUNTER — TELEPHONE (OUTPATIENT)
Age: 45
End: 2025-05-07

## 2025-05-13 NOTE — TELEPHONE ENCOUNTER
Patient was called and scheduled with a FU appointment on 5/21/2025, with Violet Paez. Her EMG results will be reviewed.

## 2025-05-19 ENCOUNTER — TELEPHONE (OUTPATIENT)
Age: 45
End: 2025-05-19

## 2025-05-21 ENCOUNTER — OFFICE VISIT (OUTPATIENT)
Age: 45
End: 2025-05-21
Payer: MEDICAID

## 2025-05-21 VITALS — HEIGHT: 66 IN | WEIGHT: 201.6 LBS | BODY MASS INDEX: 32.4 KG/M2

## 2025-05-21 DIAGNOSIS — G56.01 CARPAL TUNNEL SYNDROME ON RIGHT: Primary | ICD-10-CM

## 2025-05-21 DIAGNOSIS — G56.02 CARPAL TUNNEL SYNDROME ON LEFT: ICD-10-CM

## 2025-05-21 PROCEDURE — G8417 CALC BMI ABV UP PARAM F/U: HCPCS | Performed by: NURSE PRACTITIONER

## 2025-05-21 PROCEDURE — G8427 DOCREV CUR MEDS BY ELIG CLIN: HCPCS | Performed by: NURSE PRACTITIONER

## 2025-05-21 PROCEDURE — 4004F PT TOBACCO SCREEN RCVD TLK: CPT | Performed by: NURSE PRACTITIONER

## 2025-05-21 PROCEDURE — 99214 OFFICE O/P EST MOD 30 MIN: CPT | Performed by: NURSE PRACTITIONER

## 2025-05-21 RX ORDER — IBUPROFEN 800 MG/1
TABLET, FILM COATED ORAL
COMMUNITY
Start: 2025-04-08

## 2025-05-21 RX ORDER — OXYCODONE AND ACETAMINOPHEN 5; 325 MG/1; MG/1
1 TABLET ORAL EVERY 6 HOURS PRN
Qty: 5 TABLET | Refills: 0 | Status: SHIPPED | OUTPATIENT
Start: 2025-07-01 | End: 2025-07-02

## 2025-05-21 RX ORDER — OXYCODONE AND ACETAMINOPHEN 5; 325 MG/1; MG/1
1 TABLET ORAL EVERY 6 HOURS PRN
Qty: 5 TABLET | Refills: 0 | Status: SHIPPED | OUTPATIENT
Start: 2025-06-17 | End: 2025-06-18

## 2025-05-21 RX ORDER — SULFAMETHOXAZOLE AND TRIMETHOPRIM 800; 160 MG/1; MG/1
TABLET ORAL
COMMUNITY

## 2025-05-21 NOTE — H&P (VIEW-ONLY)
[facility]  [facility] ORTHOPEDICS  [address]  [facility] [id number] [phone]     Patient: Betty Weber   YOB: 1980   Date: 5/21/2025     Chief Complaint   Patient presents with   • Bilateral hand        History of Present Illness  Betty is a right hand dominant 45 y.o. female who presents today for follow-up assessment of bilateral hand pain with numbness and tingling that has gradually worsened over the course of approximately several years.  Patient reports a burning-like sensation at times.  She has previously treated this conservatively with activity modification, oral over-the-counter anti-inflammatories, and bilateral wrist brace with minimal improvement.  Patient reports that numbness and tingling is to all digits of bilateral hands and occasionally travels down bilateral arms.  Patient reports that she does have weakness to  bilaterally.  She also reports nocturnal symptoms.  She returns today for discussion of NCS/EMG results and future treatment options.      Past Medical History:   Diagnosis Date   • Arthritis    • Hypertension    • Labral tear of shoulder     fall from back porch      Past Surgical History:   Procedure Laterality Date   • CAPSULORRHAPHY SHOULDER Right 6/26/2017    SHOULDER CAPSULORRHAPHY BURSECTOMY performed by Denton Bedoya MD at [facility] OR   • ENDOMETRIAL ABLATION     • HYSTERECTOMY (CERVIX STATUS UNKNOWN)     • SHOULDER ARTHROSCOPY Right 6/26/2017    RIGHT SHOULDER ARHTROSCOPIC BANKART REPAIR performed by Denton Bedoya MD at [facility] OR      Social History     Socioeconomic History   • Marital status:      Spouse name: None   • Number of children: None   • Years of education: None   • Highest education level: None   Tobacco Use   • Smoking status: Every Day     Current packs/day: 1.00     Average packs/day: 1 pack/day for 15.0 years (15.0 ttl pk-yrs)     Types: Cigarettes   Substance and Sexual  Activity   • Alcohol use: No   • Drug use: No     Social Drivers of Health      Received from [facility]    Family and Community Support   Intimate Partner Violence: Not At Risk (4/2/2025)    Received from [facility]    Abuse Screen    • Feels Unsafe at Home or Work/School: no    • Feels Threatened by Someone: no    • Does Anyone Try to Keep You From Having Contact with Others or Doing Things Outside Your Home?: no    • Physical Signs of Abuse Present: no    Received from [facility] Housing Stability Vital Sign      Social History     Occupational History   • Not on file   Tobacco Use   • Smoking status: Every Day     Current packs/day: 1.00     Average packs/day: 1 pack/day for 15.0 years (15.0 ttl pk-yrs)     Types: Cigarettes   • Smokeless tobacco: Not on file   Substance and Sexual Activity   • Alcohol use: No   • Drug use: No   • Sexual activity: Not on file        Tobacco Use      Smoking status: Every Day        Packs/day: 1.00        Years: 1 pack/day for 15.0 years (15.0 ttl pk-yrs)        Types: Cigarettes      Smokeless tobacco: Not on file     No family history on file.     Medications  Current Outpatient Medications   Medication Sig Dispense Refill   • ibuprofen (ADVIL;MOTRIN) 800 MG tablet TAKE 1 TABLET BY MOUTH EVERY 8 HOURS WITH FOOD AS NEEDED     • sulfamethoxazole-trimethoprim (BACTRIM DS;SEPTRA DS) 800-160 MG per tablet sulfamethoxazole 800 mg-trimethoprim 160 mg tablet     • melatonin 10 MG CAPS capsule Take 1 capsule by mouth nightly     • traMADol (ULTRAM) 50 MG tablet Take 1 tablet by mouth every 6 hours as needed for Pain.     • lisinopril (PRINIVIL;ZESTRIL) 30 MG tablet Take 1 tablet by mouth daily Indications: High Blood Pressure       No current facility-administered medications for this visit.        Allergies  Allergies   Allergen Reactions   • Perflutren Lipid Microsphere Other (See Comments)     Severe lower back pain        Review of  "Systems  System  Neg/Pos  Details  Constitutional  Negative  Chills, Fatigue, Fever and Night Sweats  Respiratory  Negative  Chest Pain, Cough and Dyspnea  Cardio   Negative  Leg Swelling  GI   Negative  Abdominal Pain, Constipation, Nausea and Vomiting     Negative  Urinary Incontinence   Endocrine  Negative  Weight Gain and Weight Loss  MS   Negative  Except as noted in HPI and Chief Complaint    Ht 1.676 m (5' 6\")   Wt 91.4 kg (201 lb 9.6 oz)   BMI 32.54 kg/m²      Physical Exam   Physical Examination:  General: The patient is a Well Nourished 45 y.o. female who is calm, no acute distress.  Psychological: Appropriate mood and affect  HEENT: Normocephalic, Atraumatic. No gross visual or auditory acuity deficits.   Respiratory: Rate and effort within normal limits.   Vascular: Capillary refill <3 seconds to extremities  Musculoskeletal:  Bilateral wrist: On inspection, no gross deformity.  No open skin wounds or lesions.  No appreciable muscle atrophy.  Mild tenderness to palpation at the carpal tunnel.  No significant tenderness to the remainder of hand or wrist.  Able to make composite fist.  AIN, PIN and ulnar nerves are motor intact.  5/5 APB strength.  Gross sensation is intact but decreased to thumb, index and middle fingers compared to adjacent digits.  Positive Tinel's, Phalen's and Durkan's test at the carpal tunnel.  Negative Tinel's and elbow flexion test at the cubital tunnel.  Hand is warm and perfused.       Imaging & Other Studies  NCS/EMG results and report were reviewed today.  Per my interpretation, agree with the findings of moderate median neuropathies at bilateral wrist (carpal tunnel syndrome) and chronic bilateral C7 radiculopathies.        Ally Hernández is a 45 y.o. female who presents today for initial evaluation by our office with reports of pain in bilateral hands associated with numbness tingling and burning like sensation worsening over the course of approximately several years.  " Patient states that pain is worse in the right hand than in the left.  All possible future treatment options were discussed with the patient.  At this time patient wishes to proceed with surgical intervention.  Will schedule patient first for right open carpal tunnel release we will then schedule patient for left open carpal tunnel release staged 2 weeks after.  Will then follow-up with patient in approximately 2 weeks postoperatively for suture removal and further evaluation. All questions and concerns were answered.   I discussed the risks and benefits of surgery with the patient.  Risks including, but not limited to, bleeding, nerve injury, infection, failure to alleviate any or all of preoperative symptoms, symptomatic recurrence, need for additional procedures, stiffness, weakness and adhesions were discussed.  All questions were answered.  Informed consent was obtained.  Patient elected to proceed with operative intervention.        This dictation was generated by voice recognition computer software. Although all attempts are made to edit the dictation for accuracy, there may be errors in the transcription that are not intended.    Electronically signed by SHIVAM Samuels CNP on 5/21/2025 at 2:06 PM

## 2025-05-21 NOTE — PROGRESS NOTES
[facility]  [facility] ORTHOPEDICS  [address]  [facility] [id number] [phone]     Patient: Gabriele Brand   YOB: 1980   Date: 5/21/2025     Chief Complaint   Patient presents with    Bilateral hand        History of Present Illness  Gabriele is a right hand dominant 45 y.o. female who presents today for follow-up assessment of bilateral hand pain with numbness and tingling that has gradually worsened over the course of approximately several years.  Patient reports a burning-like sensation at times.  She has previously treated this conservatively with activity modification, oral over-the-counter anti-inflammatories, and bilateral wrist brace with minimal improvement.  Patient reports that numbness and tingling is to all digits of bilateral hands and occasionally travels down bilateral arms.  Patient reports that she does have weakness to  bilaterally.  She also reports nocturnal symptoms.  She returns today for discussion of NCS/EMG results and future treatment options.      Past Medical History:   Diagnosis Date    Arthritis     Hypertension     Labral tear of shoulder     fall from back porch      Past Surgical History:   Procedure Laterality Date    CAPSULORRHAPHY SHOULDER Right 6/26/2017    SHOULDER CAPSULORRHAPHY BURSECTOMY performed by Heriberto Cueto MD at [facility] OR    ENDOMETRIAL ABLATION      HYSTERECTOMY (CERVIX STATUS UNKNOWN)      SHOULDER ARTHROSCOPY Right 6/26/2017    RIGHT SHOULDER ARHTROSCOPIC BANKART REPAIR performed by Heriberto Cueto MD at [facility] OR      Social History     Socioeconomic History    Marital status:      Spouse name: None    Number of children: None    Years of education: None    Highest education level: None   Tobacco Use    Smoking status: Every Day     Current packs/day: 1.00     Average packs/day: 1 pack/day for 15.0 years (15.0 ttl pk-yrs)     Types: Cigarettes   Substance and Sexual Activity

## 2025-06-11 ENCOUNTER — PRE-ADMISSION TESTING (OUTPATIENT)
Dept: PREADMISSION TESTING | Facility: HOSPITAL | Age: 45
End: 2025-06-11
Payer: COMMERCIAL

## 2025-06-11 VITALS
WEIGHT: 198.85 LBS | OXYGEN SATURATION: 91 % | DIASTOLIC BLOOD PRESSURE: 100 MMHG | SYSTOLIC BLOOD PRESSURE: 152 MMHG | RESPIRATION RATE: 16 BRPM | BODY MASS INDEX: 33.13 KG/M2 | HEART RATE: 62 BPM | HEIGHT: 65 IN

## 2025-06-11 LAB
ANION GAP SERPL CALCULATED.3IONS-SCNC: 9 MMOL/L (ref 5–15)
BUN SERPL-MCNC: 11.9 MG/DL (ref 6–20)
BUN/CREAT SERPL: 15.5 (ref 7–25)
CALCIUM SPEC-SCNC: 9.3 MG/DL (ref 8.6–10.5)
CHLORIDE SERPL-SCNC: 106 MMOL/L (ref 98–107)
CO2 SERPL-SCNC: 28 MMOL/L (ref 22–29)
CREAT SERPL-MCNC: 0.77 MG/DL (ref 0.57–1)
DEPRECATED RDW RBC AUTO: 40.2 FL (ref 37–54)
EGFRCR SERPLBLD CKD-EPI 2021: 97.1 ML/MIN/1.73
ERYTHROCYTE [DISTWIDTH] IN BLOOD BY AUTOMATED COUNT: 12.1 % (ref 12.3–15.4)
GLUCOSE SERPL-MCNC: 99 MG/DL (ref 65–99)
HCT VFR BLD AUTO: 44.5 % (ref 34–46.6)
HGB BLD-MCNC: 14.5 G/DL (ref 12–15.9)
MCH RBC QN AUTO: 29.7 PG (ref 26.6–33)
MCHC RBC AUTO-ENTMCNC: 32.6 G/DL (ref 31.5–35.7)
MCV RBC AUTO: 91.2 FL (ref 79–97)
PLATELET # BLD AUTO: 269 10*3/MM3 (ref 140–450)
PMV BLD AUTO: 10.6 FL (ref 6–12)
POTASSIUM SERPL-SCNC: 3.8 MMOL/L (ref 3.5–5.2)
RBC # BLD AUTO: 4.88 10*6/MM3 (ref 3.77–5.28)
SODIUM SERPL-SCNC: 143 MMOL/L (ref 136–145)
WBC NRBC COR # BLD AUTO: 6.49 10*3/MM3 (ref 3.4–10.8)

## 2025-06-11 PROCEDURE — 85027 COMPLETE CBC AUTOMATED: CPT

## 2025-06-11 PROCEDURE — 36415 COLL VENOUS BLD VENIPUNCTURE: CPT

## 2025-06-11 PROCEDURE — 80048 BASIC METABOLIC PNL TOTAL CA: CPT

## 2025-06-11 NOTE — DISCHARGE INSTRUCTIONS

## 2025-06-18 ENCOUNTER — ANESTHESIA (OUTPATIENT)
Dept: PERIOP | Facility: HOSPITAL | Age: 45
End: 2025-06-18
Payer: COMMERCIAL

## 2025-06-18 ENCOUNTER — HOSPITAL ENCOUNTER (OUTPATIENT)
Facility: HOSPITAL | Age: 45
Setting detail: HOSPITAL OUTPATIENT SURGERY
Discharge: HOME | End: 2025-06-18
Attending: ORTHOPAEDIC SURGERY | Admitting: ORTHOPAEDIC SURGERY
Payer: COMMERCIAL

## 2025-06-18 ENCOUNTER — ANESTHESIA EVENT (OUTPATIENT)
Dept: PERIOP | Facility: HOSPITAL | Age: 45
End: 2025-06-18
Payer: COMMERCIAL

## 2025-06-18 VITALS
TEMPERATURE: 97.2 F | HEIGHT: 65 IN | DIASTOLIC BLOOD PRESSURE: 81 MMHG | SYSTOLIC BLOOD PRESSURE: 126 MMHG | OXYGEN SATURATION: 93 % | WEIGHT: 198.85 LBS | RESPIRATION RATE: 16 BRPM | HEART RATE: 69 BPM | BODY MASS INDEX: 33.13 KG/M2

## 2025-06-18 PROBLEM — G56.03 BILATERAL CARPAL TUNNEL SYNDROME: Status: ACTIVE | Noted: 2025-06-18

## 2025-06-18 PROCEDURE — 25010000002 ONDANSETRON PER 1 MG: Performed by: NURSE ANESTHETIST, CERTIFIED REGISTERED

## 2025-06-18 PROCEDURE — 25810000003 LACTATED RINGERS PER 1000 ML: Performed by: ORTHOPAEDIC SURGERY

## 2025-06-18 PROCEDURE — 25010000002 LIDOCAINE PF 2% 2 % SOLUTION: Performed by: NURSE ANESTHETIST, CERTIFIED REGISTERED

## 2025-06-18 PROCEDURE — 25010000002 PROPOFOL 10 MG/ML EMULSION: Performed by: NURSE ANESTHETIST, CERTIFIED REGISTERED

## 2025-06-18 PROCEDURE — 25010000002 FENTANYL CITRATE (PF) 100 MCG/2ML SOLUTION: Performed by: NURSE ANESTHETIST, CERTIFIED REGISTERED

## 2025-06-18 PROCEDURE — 25010000002 CEFAZOLIN 3 G RECONSTITUTED SOLUTION 1 EACH VIAL: Performed by: ORTHOPAEDIC SURGERY

## 2025-06-18 PROCEDURE — 25010000002 LIDOCAINE 1% - EPINEPHRINE 1:100000 1 %-1:100000 SOLUTION: Performed by: ORTHOPAEDIC SURGERY

## 2025-06-18 RX ORDER — FENTANYL CITRATE 50 UG/ML
50 INJECTION, SOLUTION INTRAMUSCULAR; INTRAVENOUS
Status: DISCONTINUED | OUTPATIENT
Start: 2025-06-18 | End: 2025-06-18 | Stop reason: HOSPADM

## 2025-06-18 RX ORDER — LABETALOL HYDROCHLORIDE 5 MG/ML
5 INJECTION, SOLUTION INTRAVENOUS
Status: DISCONTINUED | OUTPATIENT
Start: 2025-06-18 | End: 2025-06-18 | Stop reason: HOSPADM

## 2025-06-18 RX ORDER — IBUPROFEN 600 MG/1
600 TABLET, FILM COATED ORAL EVERY 6 HOURS PRN
Status: DISCONTINUED | OUTPATIENT
Start: 2025-06-18 | End: 2025-06-18 | Stop reason: HOSPADM

## 2025-06-18 RX ORDER — HYDROCODONE BITARTRATE AND ACETAMINOPHEN 10; 325 MG/1; MG/1
1 TABLET ORAL EVERY 4 HOURS PRN
Status: DISCONTINUED | OUTPATIENT
Start: 2025-06-18 | End: 2025-06-18 | Stop reason: HOSPADM

## 2025-06-18 RX ORDER — ACETAMINOPHEN 500 MG
1000 TABLET ORAL ONCE
Status: COMPLETED | OUTPATIENT
Start: 2025-06-18 | End: 2025-06-18

## 2025-06-18 RX ORDER — ONDANSETRON 2 MG/ML
4 INJECTION INTRAMUSCULAR; INTRAVENOUS ONCE AS NEEDED
Status: DISCONTINUED | OUTPATIENT
Start: 2025-06-18 | End: 2025-06-18 | Stop reason: HOSPADM

## 2025-06-18 RX ORDER — PROPOFOL 10 MG/ML
VIAL (ML) INTRAVENOUS AS NEEDED
Status: DISCONTINUED | OUTPATIENT
Start: 2025-06-18 | End: 2025-06-18 | Stop reason: SURG

## 2025-06-18 RX ORDER — LIDOCAINE HYDROCHLORIDE 10 MG/ML
0.5 INJECTION, SOLUTION EPIDURAL; INFILTRATION; INTRACAUDAL; PERINEURAL ONCE AS NEEDED
Status: DISCONTINUED | OUTPATIENT
Start: 2025-06-18 | End: 2025-06-18 | Stop reason: HOSPADM

## 2025-06-18 RX ORDER — NALOXONE HCL 0.4 MG/ML
0.4 VIAL (ML) INJECTION AS NEEDED
Status: DISCONTINUED | OUTPATIENT
Start: 2025-06-18 | End: 2025-06-18 | Stop reason: HOSPADM

## 2025-06-18 RX ORDER — SODIUM CHLORIDE 9 MG/ML
40 INJECTION, SOLUTION INTRAVENOUS AS NEEDED
Status: DISCONTINUED | OUTPATIENT
Start: 2025-06-18 | End: 2025-06-18 | Stop reason: HOSPADM

## 2025-06-18 RX ORDER — FLUMAZENIL 0.1 MG/ML
0.2 INJECTION INTRAVENOUS AS NEEDED
Status: DISCONTINUED | OUTPATIENT
Start: 2025-06-18 | End: 2025-06-18 | Stop reason: HOSPADM

## 2025-06-18 RX ORDER — FENTANYL CITRATE 50 UG/ML
INJECTION, SOLUTION INTRAMUSCULAR; INTRAVENOUS AS NEEDED
Status: DISCONTINUED | OUTPATIENT
Start: 2025-06-18 | End: 2025-06-18 | Stop reason: SURG

## 2025-06-18 RX ORDER — SODIUM CHLORIDE, SODIUM LACTATE, POTASSIUM CHLORIDE, CALCIUM CHLORIDE 600; 310; 30; 20 MG/100ML; MG/100ML; MG/100ML; MG/100ML
20 INJECTION, SOLUTION INTRAVENOUS CONTINUOUS
Status: DISCONTINUED | OUTPATIENT
Start: 2025-06-18 | End: 2025-06-18 | Stop reason: HOSPADM

## 2025-06-18 RX ORDER — ONDANSETRON 2 MG/ML
INJECTION INTRAMUSCULAR; INTRAVENOUS AS NEEDED
Status: DISCONTINUED | OUTPATIENT
Start: 2025-06-18 | End: 2025-06-18 | Stop reason: SURG

## 2025-06-18 RX ORDER — EPHEDRINE SULFATE 50 MG/ML
INJECTION, SOLUTION INTRAVENOUS AS NEEDED
Status: DISCONTINUED | OUTPATIENT
Start: 2025-06-18 | End: 2025-06-18 | Stop reason: SURG

## 2025-06-18 RX ORDER — MAGNESIUM HYDROXIDE 1200 MG/15ML
LIQUID ORAL AS NEEDED
Status: DISCONTINUED | OUTPATIENT
Start: 2025-06-18 | End: 2025-06-18 | Stop reason: HOSPADM

## 2025-06-18 RX ORDER — SODIUM CHLORIDE 0.9 % (FLUSH) 0.9 %
3-10 SYRINGE (ML) INJECTION AS NEEDED
Status: DISCONTINUED | OUTPATIENT
Start: 2025-06-18 | End: 2025-06-18 | Stop reason: HOSPADM

## 2025-06-18 RX ORDER — LIDOCAINE HYDROCHLORIDE 20 MG/ML
INJECTION, SOLUTION EPIDURAL; INFILTRATION; INTRACAUDAL; PERINEURAL AS NEEDED
Status: DISCONTINUED | OUTPATIENT
Start: 2025-06-18 | End: 2025-06-18 | Stop reason: SURG

## 2025-06-18 RX ORDER — SODIUM CHLORIDE 0.9 % (FLUSH) 0.9 %
3 SYRINGE (ML) INJECTION AS NEEDED
Status: DISCONTINUED | OUTPATIENT
Start: 2025-06-18 | End: 2025-06-18 | Stop reason: HOSPADM

## 2025-06-18 RX ORDER — HYDROCODONE BITARTRATE AND ACETAMINOPHEN 5; 325 MG/1; MG/1
1 TABLET ORAL EVERY 4 HOURS PRN
Status: DISCONTINUED | OUTPATIENT
Start: 2025-06-18 | End: 2025-06-18 | Stop reason: HOSPADM

## 2025-06-18 RX ORDER — LIDOCAINE HYDROCHLORIDE AND EPINEPHRINE 10; 10 MG/ML; UG/ML
INJECTION, SOLUTION INFILTRATION; PERINEURAL AS NEEDED
Status: DISCONTINUED | OUTPATIENT
Start: 2025-06-18 | End: 2025-06-18 | Stop reason: HOSPADM

## 2025-06-18 RX ORDER — MIDAZOLAM HYDROCHLORIDE 2 MG/2ML
1 INJECTION, SOLUTION INTRAMUSCULAR; INTRAVENOUS
Status: DISCONTINUED | OUTPATIENT
Start: 2025-06-18 | End: 2025-06-18 | Stop reason: HOSPADM

## 2025-06-18 RX ORDER — SODIUM CHLORIDE, SODIUM LACTATE, POTASSIUM CHLORIDE, CALCIUM CHLORIDE 600; 310; 30; 20 MG/100ML; MG/100ML; MG/100ML; MG/100ML
100 INJECTION, SOLUTION INTRAVENOUS CONTINUOUS
Status: DISCONTINUED | OUTPATIENT
Start: 2025-06-18 | End: 2025-06-18 | Stop reason: HOSPADM

## 2025-06-18 RX ORDER — SODIUM CHLORIDE 0.9 % (FLUSH) 0.9 %
3 SYRINGE (ML) INJECTION EVERY 12 HOURS SCHEDULED
Status: DISCONTINUED | OUTPATIENT
Start: 2025-06-18 | End: 2025-06-18 | Stop reason: HOSPADM

## 2025-06-18 RX ADMIN — ONDANSETRON 4 MG: 2 INJECTION INTRAMUSCULAR; INTRAVENOUS at 09:27

## 2025-06-18 RX ADMIN — SODIUM CHLORIDE, POTASSIUM CHLORIDE, SODIUM LACTATE AND CALCIUM CHLORIDE 20 ML/HR: 600; 310; 30; 20 INJECTION, SOLUTION INTRAVENOUS at 06:36

## 2025-06-18 RX ADMIN — ACETAMINOPHEN 1000 MG: 500 TABLET, FILM COATED ORAL at 08:49

## 2025-06-18 RX ADMIN — LIDOCAINE HYDROCHLORIDE 80 MG: 20 INJECTION, SOLUTION EPIDURAL; INFILTRATION; INTRACAUDAL; PERINEURAL at 09:27

## 2025-06-18 RX ADMIN — FENTANYL CITRATE 100 MCG: 50 INJECTION, SOLUTION INTRAMUSCULAR; INTRAVENOUS at 09:27

## 2025-06-18 RX ADMIN — SODIUM CHLORIDE 3000 MG: 900 INJECTION INTRAVENOUS at 09:31

## 2025-06-18 RX ADMIN — GLYCOPYRROLATE 0.2 MG: 0.2 INJECTION INTRAMUSCULAR; INTRAVENOUS at 09:37

## 2025-06-18 RX ADMIN — HYDROCODONE BITARTRATE AND ACETAMINOPHEN 1 TABLET: 5; 325 TABLET ORAL at 10:40

## 2025-06-18 RX ADMIN — EPHEDRINE SULFATE 12.5 MG: 50 INJECTION, SOLUTION INTRAVENOUS at 09:43

## 2025-06-18 RX ADMIN — PROPOFOL 200 MG: 10 INJECTION, EMULSION INTRAVENOUS at 09:27

## 2025-06-18 NOTE — ANESTHESIA PREPROCEDURE EVALUATION
Anesthesia Evaluation     no history of anesthetic complications:   NPO Solid Status: > 8 hours  NPO Liquid Status: > 8 hours           Airway   Mallampati: I  TM distance: >3 FB  No difficulty expected  Dental      Pulmonary    (+) a smoker Current,  Cardiovascular   Exercise tolerance: good (4-7 METS)    (+) hypertension  (-) CAD      Neuro/Psych  (-) seizures, TIA, CVA  GI/Hepatic/Renal/Endo    (+) GERD  (-) liver disease, no renal disease, diabetes    Musculoskeletal     Abdominal    Substance History      OB/GYN          Other   arthritis,                 Anesthesia Plan    ASA 2     MAC     intravenous induction     Anesthetic plan, risks, benefits, and alternatives have been provided, discussed and informed consent has been obtained with: patient.    CODE STATUS:

## 2025-06-18 NOTE — OP NOTE
Patient Name: Renee  : 1980  MRN: [id number]      DATE of SURGERY: 2025    SURGEON: Theodore Hutson MD    ASSISTANT: NONE    PREOPERATIVE DIAGNOSIS    Right carpal tunnel syndrome.       POSTOPERATIVE DIAGNOSIS    Right carpal tunnel syndrome.       PROCEDURE PERFORMED    Right open carpal tunnel release.       IMPLANTS  None.       ANESTHESIA    LMA with local anesthetic      OPERATIVE INDICATIONS    The patient is a 45 y.o. female who presented to my clinic with complaints of numbness and tingling in the hand with clinical exam and neurodiagnostic evidence of carpal tunnel syndrome.  The patient wished to proceed with surgery, understanding the risks, benefits, and alternatives.  Conservative treatment failed to improve symptoms.  The risks include, but are not limited to, that of anesthesia, bleeding, infection, pain, damage to the motor and sensory branch of the median nerve, chronic pillar pain, incomplete resolution of symptoms.       ESTIMATED BLOOD LOSS    Less than 5 mL.       SPECIMENS    None.       DRAINS  None.       COMPLICATIONS    None.       PROCEDURE IN DETAIL    The patient was met in the preoperative holding room where the correct patient, procedure and side were confirmed.  The operative site was marked by myself with the patient's agreement.  The consent was signed by myself.  Patient was transferred to the operating room, and a formal timeout was performed identifying the correct patient and the operative site. The tourniquet was then placed on the brachium of the operative extremity. A sterile prep and drape was performed.       A skin marker was used to delineate an approximately 2 cm incision in line with the radial aspect of the fourth ray beginning proximal to Simpson's cardinal line and ending distal to the wrist flexion crease.  The operative extremity was then exsanguinated with an Esmarch and the tourniquet was inflated to 250 mmHg for less than 10 minutes.  A 15  blade scalpel was used to make a longitudinal incision only through the skin. Soft tissue was dissected in line with the incision through the palmar fascia. The transverse carpal ligament was identified and incised until the carpal tunnel contents were identified.  A distal release was performed first utilizing deep knife and blunt tipped scissor dissection until the palmar fat pad was identified. The proximal portion of the transverse carpal ligament was released in a similar fashion. Finally, a pair of blunt-tipped scissors was inserted with the points directed toward the superficial and ulnar aspect of the wrist to protect the palmar cutaneous branch of the median nerve and the distal forearm fascia was incised.  The release was then inspected both visually and on palpation with no additional constriction points appreciated.  The median nerve was inspected and found to be intact with no significant hypertrophic tenosynovium or soft tissue masses noted.  The tourniquet was then deflated hemostasis was obtained with bipolar electrocautery.  The incision was irrigated and the skin closed with 4-0 nylon sutures.  For postoperative pain control, 10 cc of 1% lidocaine with epinephrine were injected in line with the incision.  A soft dressing was placed, the patient was awakened from anesthesia and transported to the recovery room in stable condition. All counts at the end of the procedure were correct. Patient tolerated the procedure well and was without complication.      POSTOPERATIVE PLAN  Discharge home, return to clinic in 2 weeks for suture removal and activity as tolerated. No lifting heavier than 5 pounds for 2 weeks.

## 2025-06-18 NOTE — ANESTHESIA PROCEDURE NOTES
Airway  Reason: elective    Date/Time: 6/18/2025 9:27 AM  Airway not difficult    General Information and Staff    Patient location during procedure: OR    Indications and Patient Condition  Indications for airway management: airway protection    Preoxygenated: yes  MILS maintained throughout    Mask difficulty assessment: 1 - vent by mask    Final Airway Details    Final airway type: supraglottic airway      Successful airway: I-gel  Size: 4   Number of attempts at approach: 1  Assessment: lips, teeth, and gum same as pre-op

## 2025-06-18 NOTE — ANESTHESIA POSTPROCEDURE EVALUATION
"Patient: Betty Weber    Procedure Summary       Date: 06/18/25 Room / Location:  [facility] OR 09 /  [facility]    Anesthesia Start: 0925 Anesthesia Stop: 1000    Procedure: RIGHT WRIST CARPAL TUNNEL RELEASE(RIGHT WRIST NERVE RELEASE) (Right: Wrist) Diagnosis: (G56.01)    Surgeons: Theodore Hutson MD Provider: Polly Jacobs CRNA    Anesthesia Type: MAC ASA Status: 2            Anesthesia Type: MAC    Vitals  Vitals Value Taken Time   /77 06/18/25 11:01   Temp 97.2 °F (36.2 °C) 06/18/25 09:59   Pulse 67 06/18/25 11:04   Resp 16 06/18/25 10:47   SpO2 92 % 06/18/25 11:04   Vitals shown include unfiled device data.        Post Anesthesia Care and Evaluation    Patient location during evaluation: PACU  Patient participation: complete - patient participated  Level of consciousness: awake and awake and alert  Pain score: 0  Pain management: adequate    Airway patency: patent  Anesthetic complications: No anesthetic complications  PONV Status: none  Cardiovascular status: acceptable  Respiratory status: acceptable  Hydration status: acceptable    Comments: Patient discharged according to acceptable Ben score per RN assessment. See nursing records for further information.     Blood pressure 119/86, pulse 68, temperature 97.2 °F (36.2 °C), temperature source Temporal, resp. rate 16, height 165 cm (64.96\"), weight 90.2 kg (198 lb 13.7 oz), SpO2 93%, not currently breastfeeding.      "

## 2025-07-01 ENCOUNTER — ANESTHESIA EVENT (OUTPATIENT)
Dept: PERIOP | Facility: HOSPITAL | Age: 45
End: 2025-07-01
Payer: COMMERCIAL

## 2025-07-01 ENCOUNTER — TELEPHONE (OUTPATIENT)
Age: 45
End: 2025-07-01

## 2025-07-01 NOTE — TELEPHONE ENCOUNTER
Pt called and was concerned about her sutures on her right hand. I let her know they will remove during her surgery tomorrow. She understood and all questions were answered at this time.

## 2025-07-02 ENCOUNTER — ANESTHESIA (OUTPATIENT)
Dept: PERIOP | Facility: HOSPITAL | Age: 45
End: 2025-07-02
Payer: COMMERCIAL

## 2025-07-02 ENCOUNTER — HOSPITAL ENCOUNTER (OUTPATIENT)
Facility: HOSPITAL | Age: 45
Setting detail: HOSPITAL OUTPATIENT SURGERY
Discharge: HOME | End: 2025-07-02
Attending: ORTHOPAEDIC SURGERY | Admitting: ORTHOPAEDIC SURGERY
Payer: COMMERCIAL

## 2025-07-02 VITALS
TEMPERATURE: 97.2 F | RESPIRATION RATE: 16 BRPM | HEART RATE: 74 BPM | WEIGHT: 203.26 LBS | BODY MASS INDEX: 34.7 KG/M2 | DIASTOLIC BLOOD PRESSURE: 82 MMHG | OXYGEN SATURATION: 96 % | HEIGHT: 64 IN | SYSTOLIC BLOOD PRESSURE: 187 MMHG

## 2025-07-02 PROCEDURE — 25010000002 PROPOFOL 10 MG/ML EMULSION: Performed by: NURSE ANESTHETIST, CERTIFIED REGISTERED

## 2025-07-02 PROCEDURE — 25810000003 LACTATED RINGERS PER 1000 ML: Performed by: ORTHOPAEDIC SURGERY

## 2025-07-02 PROCEDURE — 25010000002 LIDOCAINE PF 2% 2 % SOLUTION: Performed by: NURSE ANESTHETIST, CERTIFIED REGISTERED

## 2025-07-02 PROCEDURE — 25010000002 MIDAZOLAM PER 1MG: Performed by: ANESTHESIOLOGY

## 2025-07-02 PROCEDURE — 25010000002 CEFAZOLIN 3 G RECONSTITUTED SOLUTION 1 EACH VIAL: Performed by: ORTHOPAEDIC SURGERY

## 2025-07-02 PROCEDURE — 25010000002 LIDOCAINE 1% - EPINEPHRINE 1:100000 1 %-1:100000 SOLUTION: Performed by: ORTHOPAEDIC SURGERY

## 2025-07-02 PROCEDURE — 25010000002 FENTANYL CITRATE (PF) 100 MCG/2ML SOLUTION: Performed by: NURSE ANESTHETIST, CERTIFIED REGISTERED

## 2025-07-02 RX ORDER — MIDAZOLAM HYDROCHLORIDE 2 MG/2ML
1 INJECTION, SOLUTION INTRAMUSCULAR; INTRAVENOUS
Status: DISCONTINUED | OUTPATIENT
Start: 2025-07-02 | End: 2025-07-02 | Stop reason: HOSPADM

## 2025-07-02 RX ORDER — LIDOCAINE HYDROCHLORIDE 20 MG/ML
INJECTION, SOLUTION EPIDURAL; INFILTRATION; INTRACAUDAL; PERINEURAL AS NEEDED
Status: DISCONTINUED | OUTPATIENT
Start: 2025-07-02 | End: 2025-07-02 | Stop reason: SURG

## 2025-07-02 RX ORDER — SODIUM CHLORIDE, SODIUM LACTATE, POTASSIUM CHLORIDE, CALCIUM CHLORIDE 600; 310; 30; 20 MG/100ML; MG/100ML; MG/100ML; MG/100ML
100 INJECTION, SOLUTION INTRAVENOUS CONTINUOUS
Status: DISCONTINUED | OUTPATIENT
Start: 2025-07-02 | End: 2025-07-02 | Stop reason: HOSPADM

## 2025-07-02 RX ORDER — HYDROCODONE BITARTRATE AND ACETAMINOPHEN 10; 325 MG/1; MG/1
1 TABLET ORAL EVERY 4 HOURS PRN
Status: DISCONTINUED | OUTPATIENT
Start: 2025-07-02 | End: 2025-07-02 | Stop reason: HOSPADM

## 2025-07-02 RX ORDER — SODIUM CHLORIDE 0.9 % (FLUSH) 0.9 %
10 SYRINGE (ML) INJECTION EVERY 12 HOURS SCHEDULED
Status: DISCONTINUED | OUTPATIENT
Start: 2025-07-02 | End: 2025-07-02 | Stop reason: HOSPADM

## 2025-07-02 RX ORDER — PROPOFOL 10 MG/ML
VIAL (ML) INTRAVENOUS AS NEEDED
Status: DISCONTINUED | OUTPATIENT
Start: 2025-07-02 | End: 2025-07-02 | Stop reason: SURG

## 2025-07-02 RX ORDER — LIDOCAINE HYDROCHLORIDE AND EPINEPHRINE 10; 10 MG/ML; UG/ML
INJECTION, SOLUTION INFILTRATION; PERINEURAL AS NEEDED
Status: DISCONTINUED | OUTPATIENT
Start: 2025-07-02 | End: 2025-07-02 | Stop reason: HOSPADM

## 2025-07-02 RX ORDER — HYDROCODONE BITARTRATE AND ACETAMINOPHEN 5; 325 MG/1; MG/1
1 TABLET ORAL EVERY 4 HOURS PRN
Status: DISCONTINUED | OUTPATIENT
Start: 2025-07-02 | End: 2025-07-02 | Stop reason: HOSPADM

## 2025-07-02 RX ORDER — FENTANYL CITRATE 50 UG/ML
25 INJECTION, SOLUTION INTRAMUSCULAR; INTRAVENOUS
Status: DISCONTINUED | OUTPATIENT
Start: 2025-07-02 | End: 2025-07-02 | Stop reason: HOSPADM

## 2025-07-02 RX ORDER — SODIUM CHLORIDE 0.9 % (FLUSH) 0.9 %
3 SYRINGE (ML) INJECTION AS NEEDED
Status: DISCONTINUED | OUTPATIENT
Start: 2025-07-02 | End: 2025-07-02 | Stop reason: HOSPADM

## 2025-07-02 RX ORDER — MAGNESIUM HYDROXIDE 1200 MG/15ML
LIQUID ORAL AS NEEDED
Status: DISCONTINUED | OUTPATIENT
Start: 2025-07-02 | End: 2025-07-02 | Stop reason: HOSPADM

## 2025-07-02 RX ORDER — FENTANYL CITRATE 50 UG/ML
50 INJECTION, SOLUTION INTRAMUSCULAR; INTRAVENOUS
Status: DISCONTINUED | OUTPATIENT
Start: 2025-07-02 | End: 2025-07-02 | Stop reason: HOSPADM

## 2025-07-02 RX ORDER — IBUPROFEN 600 MG/1
600 TABLET, FILM COATED ORAL EVERY 6 HOURS PRN
Status: DISCONTINUED | OUTPATIENT
Start: 2025-07-02 | End: 2025-07-02 | Stop reason: HOSPADM

## 2025-07-02 RX ORDER — LIDOCAINE HYDROCHLORIDE 10 MG/ML
0.5 INJECTION, SOLUTION EPIDURAL; INFILTRATION; INTRACAUDAL; PERINEURAL ONCE AS NEEDED
Status: DISCONTINUED | OUTPATIENT
Start: 2025-07-02 | End: 2025-07-02 | Stop reason: HOSPADM

## 2025-07-02 RX ORDER — ONDANSETRON 2 MG/ML
4 INJECTION INTRAMUSCULAR; INTRAVENOUS ONCE AS NEEDED
Status: DISCONTINUED | OUTPATIENT
Start: 2025-07-02 | End: 2025-07-02 | Stop reason: HOSPADM

## 2025-07-02 RX ORDER — DEXTROSE MONOHYDRATE 25 G/50ML
12.5 INJECTION, SOLUTION INTRAVENOUS AS NEEDED
Status: DISCONTINUED | OUTPATIENT
Start: 2025-07-02 | End: 2025-07-02 | Stop reason: HOSPADM

## 2025-07-02 RX ORDER — NALOXONE HCL 0.4 MG/ML
0.4 VIAL (ML) INJECTION AS NEEDED
Status: DISCONTINUED | OUTPATIENT
Start: 2025-07-02 | End: 2025-07-02 | Stop reason: HOSPADM

## 2025-07-02 RX ORDER — SODIUM CHLORIDE 0.9 % (FLUSH) 0.9 %
10 SYRINGE (ML) INJECTION AS NEEDED
Status: DISCONTINUED | OUTPATIENT
Start: 2025-07-02 | End: 2025-07-02 | Stop reason: HOSPADM

## 2025-07-02 RX ORDER — FLUMAZENIL 0.1 MG/ML
0.2 INJECTION INTRAVENOUS AS NEEDED
Status: DISCONTINUED | OUTPATIENT
Start: 2025-07-02 | End: 2025-07-02 | Stop reason: HOSPADM

## 2025-07-02 RX ORDER — FENTANYL CITRATE 50 UG/ML
INJECTION, SOLUTION INTRAMUSCULAR; INTRAVENOUS AS NEEDED
Status: DISCONTINUED | OUTPATIENT
Start: 2025-07-02 | End: 2025-07-02 | Stop reason: SURG

## 2025-07-02 RX ORDER — LABETALOL HYDROCHLORIDE 5 MG/ML
5 INJECTION, SOLUTION INTRAVENOUS
Status: DISCONTINUED | OUTPATIENT
Start: 2025-07-02 | End: 2025-07-02 | Stop reason: HOSPADM

## 2025-07-02 RX ADMIN — SODIUM CHLORIDE, POTASSIUM CHLORIDE, SODIUM LACTATE AND CALCIUM CHLORIDE 100 ML/HR: 600; 310; 30; 20 INJECTION, SOLUTION INTRAVENOUS at 06:00

## 2025-07-02 RX ADMIN — FENTANYL CITRATE 100 MCG: 50 INJECTION, SOLUTION INTRAMUSCULAR; INTRAVENOUS at 07:31

## 2025-07-02 RX ADMIN — MIDAZOLAM HYDROCHLORIDE 1 MG: 1 INJECTION, SOLUTION INTRAMUSCULAR; INTRAVENOUS at 06:49

## 2025-07-02 RX ADMIN — PROPOFOL 150 MCG/KG/MIN: 10 INJECTION, EMULSION INTRAVENOUS at 07:36

## 2025-07-02 RX ADMIN — LIDOCAINE HYDROCHLORIDE 60 MG: 20 INJECTION, SOLUTION EPIDURAL; INFILTRATION; INTRACAUDAL; PERINEURAL at 07:35

## 2025-07-02 RX ADMIN — PROPOFOL 60 MG: 10 INJECTION, EMULSION INTRAVENOUS at 07:35

## 2025-07-02 RX ADMIN — SODIUM CHLORIDE 3000 MG: 900 INJECTION INTRAVENOUS at 07:37

## 2025-07-02 NOTE — H&P
[facility]   HISTORY AND PHYSICAL    Patient Name: Betty Weber  : 1980  MRN: [id number]  Primary Care Physician:  Manuel Izaguirre APRN  Date of admission: 2025      Subjective     Chief Complaint: Left hand numbness and tingling    History of Present Illness  Ms. Weber is a 45-year-old female who presents today for planned staged left open carpal tunnel release.  She underwent right open carpal tunnel release approximately 2 weeks ago and is doing reasonably well.  Still has some persistent but improved numbness to the right hand.  Describes pain to radial and ulnar aspects at the surgical site consistent with pillar pain.  Reports no significant interval medical changes.    Review of Systems   Complete review of systems was reviewed today and is unremarkable except for HPI.    Personal History     Past Medical History:   Diagnosis Date    Arthritis     Carpal tunnel syndrome 2025    GERD (gastroesophageal reflux disease)     Hypertension     Shoulder dislocation     TMJ (temporomandibular joint syndrome)        Past Surgical History:   Procedure Laterality Date    CARPAL TUNNEL RELEASE Right 2025    Procedure: RIGHT WRIST CARPAL TUNNEL RELEASE(RIGHT WRIST NERVE RELEASE);  Surgeon: Theodore Hutson MD;  Location: [facility] OR;  Service: Orthopedics;  Laterality: Right;    ENDOSCOPY N/A 2019    Procedure: ESOPHAGOGASTRODUODENOSCOPY WITH ANESTHESIA;  Surgeon: Khari Ramirez DO;  Location: [facility] ENDOSCOPY;  Service: Gastroenterology    HYSTERECTOMY         Family History   Problem Relation Age of Onset    Colon cancer Neg Hx     Colon polyps Neg Hx     Esophageal cancer Neg Hx     Breast cancer Neg Hx        Social History     Socioeconomic History    Marital status:    Tobacco Use    Smoking status: Some Days     Current packs/day: 0.50     Average packs/day: 0.5 packs/day for 5.0 years (2.5 ttl pk-yrs)     Types: Cigarettes    Smokeless tobacco: Never   Vaping  "Use    Vaping status: Never Used   Substance and Sexual Activity    Alcohol use: No    Drug use: No    Sexual activity: Defer     Birth control/protection: Hysterectomy       Prior to Admission medications    Medication Sig Start Date End Date Taking? Authorizing Provider   lisinopril (PRINIVIL,ZESTRIL) 20 MG tablet Take 1 tablet by mouth Every Night. 8/22/24  Yes Yolette Banda MD   baclofen (LIORESAL) 10 MG tablet Take 1 tablet by mouth 3 (Three) Times a Day As Needed. 8/22/24   Yolette Banda MD   ibuprofen (ADVIL,MOTRIN) 800 MG tablet Take 1 tablet by mouth 2 (Two) Times a Day As Needed. 8/22/24   Yolette Banda MD   melatonin 5 MG tablet tablet Take 2 tablets by mouth At Night As Needed (sleep). 8/22/24   Yolette Banda MD   pantoprazole (PROTONIX) 40 MG EC tablet Take 1 tablet by mouth Daily.  Patient not taking: Reported on 7/1/2025 12/14/18   Emergency, Nurse Sandra RN   traMADol (ULTRAM) 50 MG tablet Take 1 tablet by mouth.  Patient not taking: Reported on 6/11/2025    Emergency, Nurse Sandra, RN       Ct contrast and Definity [perflutren lipid microsphere]        Objective     Vitals:   BP (!) 185/98 (BP Location: Left arm, Patient Position: Lying)   Pulse 52   Temp 97.9 °F (36.6 °C) (Temporal)   Resp 16   Ht 162 cm (63.78\")   Wt 92.2 kg (203 lb 4.2 oz)   BMI 35.13 kg/m²     Physical Exam    General: Awake, alert, no acute distress  HEENT: Head is normocephalic, atraumatic.  No gross visual or auditory acuity deficits.  Respiratory: Nonlabored  Cardiovascular: Regular rate  Bilateral upper extremities: Surgical incision on the right is well-approximated with sutures in place.  No open skin wounds or lesions about the left hand or wrist.  Stable neurovascular exam to bilateral hands.  Psychiatric: Calm and cooperative  Neurologic: Alert and oriented x3         Assessment / Plan     Brief Patient Summary:  Betty Weber is a 45 y.o. female who presents for planned left " open carpal tunnel release and suture removal from the right wrist.    Active Hospital Problems:  There are no active hospital problems to display for this patient.    Plan:   See above      Theodore Hutson MD

## 2025-07-02 NOTE — DISCHARGE INSTRUCTIONS
1.  Elevate operative upper extremity.  Encourage gentle finger range of motion.  2.  No lifting greater than 5-10 pounds with the operative hand until follow-up.  Activity as tolerated with the right hand.  3.  May remove dressing on postoperative day 3 and begin gentle hand hygiene.  Refrain from soaking the wound in water or applying any lotions or ointments.  4.  Pain medication as prescribed.  No additional Tylenol, alcohol or driving while on opioid pain medication.  Wean off pain medication as able.  5.  Return to clinic in 2 weeks for wound check and likely suture removal.  6.  Call our clinic number in the interim with any questions or concerns.    recent death of son  - c/w lexapro

## 2025-07-02 NOTE — ANESTHESIA PREPROCEDURE EVALUATION
Anesthesia Evaluation     Patient summary reviewed   no history of anesthetic complications:   NPO Solid Status: > 8 hours  NPO Liquid Status: > 8 hours           Airway   Mallampati: I  TM distance: >3 FB  No difficulty expected  Dental      Pulmonary    (+) a smoker Current,  (-) COPD, asthma, sleep apnea  Cardiovascular   Exercise tolerance: good (4-7 METS)    (+) hypertension  (-) pacemaker, past MI, CAD, angina, cardiac stents      Neuro/Psych  (-) seizures, TIA, CVA  GI/Hepatic/Renal/Endo    (+) GERD  (-) liver disease, no renal disease, diabetes    Musculoskeletal     Abdominal    Substance History      OB/GYN          Other   arthritis,                 Anesthesia Plan    ASA 2     MAC     intravenous induction     Anesthetic plan, risks, benefits, and alternatives have been provided, discussed and informed consent has been obtained with: patient.    CODE STATUS:

## 2025-07-02 NOTE — ANESTHESIA POSTPROCEDURE EVALUATION
"Patient: Betty Weber    Procedure Summary       Date: 07/02/25 Room / Location: [facility] OR  /  [facility] OR    Anesthesia Start: 0731 Anesthesia Stop: 0817    Procedure: LEFT WRIST CARPAL TUNNEL RELEASE *REMOVE SUTURES ON RIGHT* (LEFT WRIST NERVE RELEASE) (Left: Wrist) Diagnosis: (G56.02)    Surgeons: Theodore Hutson MD Provider: Theodore Gann CRNA    Anesthesia Type: MAC ASA Status: 2            Anesthesia Type: MAC    Vitals  Vitals Value Taken Time   /76 07/02/25 08:16   Temp     Pulse 85 07/02/25 08:17   Resp     SpO2 94 % 07/02/25 08:17   Vitals shown include unfiled device data.        Post Anesthesia Care and Evaluation    Patient location during evaluation: PHASE II  Patient participation: complete - patient participated  Level of consciousness: awake and alert  Pain score: 0  Pain management: adequate    Airway patency: patent  Anesthetic complications: No anesthetic complications  PONV Status: none  Cardiovascular status: acceptable  Respiratory status: acceptable  Hydration status: acceptable    Comments: Blood pressure (!) 185/98, pulse 52, temperature 97.9 °F (36.6 °C), temperature source Temporal, resp. rate 16, height 162 cm (63.78\"), weight 92.2 kg (203 lb 4.2 oz), not currently breastfeeding.    Pt discharged from PACU based on tom score >8    "

## 2025-07-02 NOTE — OP NOTE
Patient Name: Renee  : 1980  MRN: [id number]      DATE of SURGERY: 2025    SURGEON: Theodore Hutson MD    ASSISTANT: NONE    PREOPERATIVE DIAGNOSIS    Left carpal tunnel syndrome.       POSTOPERATIVE DIAGNOSIS    Left carpal tunnel syndrome.       PROCEDURE PERFORMED    Left open carpal tunnel release.       IMPLANTS  None.       ANESTHESIA    Monitored anesthesia care with local anesthetic      OPERATIVE INDICATIONS    The patient is a 45 y.o. female who presented to my clinic with complaints of numbness and tingling in the hand with clinical exam and neurodiagnostic evidence of carpal tunnel syndrome.  The patient wished to proceed with surgery, understanding the risks, benefits, and alternatives.  Conservative treatment failed to improve symptoms.  The risks include, but are not limited to, that of anesthesia, bleeding, infection, pain, damage to the motor and sensory branch of the median nerve, chronic pillar pain, incomplete resolution of symptoms.    She underwent similar procedure on the right side approximately 2 weeks ago.  She has had no significant interval postoperative issues.  Still has some persistent numbness to the right hand and pain consistent with pillar pain but otherwise doing reasonably well.     ESTIMATED BLOOD LOSS    Less than 5 mL.       SPECIMENS    None.       DRAINS  None.       COMPLICATIONS    None.       PROCEDURE IN DETAIL    The patient was met in the preoperative holding room where the correct patient, procedure and side were confirmed.  The operative site was marked by myself with the patient's agreement.  The consent was signed by myself.  Patient was transferred to the operating room, and a formal timeout was performed identifying the correct patient and the operative site. The tourniquet was then placed on the brachium of the operative extremity. A sterile prep and drape was performed.       A skin marker was used to delineate an approximately 2 cm incision  in line with the radial aspect of the fourth ray beginning proximal to Simpson's cardinal line and ending distal to the wrist flexion crease.  Prior to inflation of the tourniquet, a local block was administered with 1% lidocaine with epinephrine. Approximately 3cc were injected at the wrist flexion crease creating a subcutaneous wheal and the remaining 7 cc injected in line with the proposed incision. The operative extremity was then exsanguinated with an Esmarch and the tourniquet was inflated to 200 mmHg for less than 10 minutes.  A 15 blade scalpel was used to make a longitudinal incision only through the skin. Soft tissue was dissected in line with the incision through the palmar fascia. The transverse carpal ligament was identified and incised until the carpal tunnel contents were identified.  A distal release was performed first utilizing deep knife and blunt tipped scissor dissection until the palmar fat pad was identified. The proximal portion of the transverse carpal ligament was released in a similar fashion. Finally, a pair of blunt-tipped scissors was inserted with the points directed toward the superficial and ulnar aspect of the wrist to protect the palmar cutaneous branch of the median nerve and the distal forearm fascia was incised.  The release was then inspected both visually and on palpation with no additional constriction points appreciated.  The median nerve was inspected and found to be intact with no significant hypertrophic tenosynovium or soft tissue masses noted.  The tourniquet was then deflated hemostasis was obtained with bipolar electrocautery.  The incision was irrigated and the skin closed with 4-0 nylon sutures.  A soft dressing was placed.  Prior to  reversal of anesthesia, sutures were removed from the right hand.  Surgical incision was benign with no clinical sign of infection.  The patient was awakened from anesthesia and transported to the recovery room in stable condition. All  counts at the end of the procedure were correct. Patient tolerated the procedure well and was without complication.      POSTOPERATIVE PLAN  Discharge home, return to clinic in 2 weeks for suture removal and activity as tolerated. No lifting heavier than 5 pounds for 2 weeks.

## 2025-07-15 NOTE — PROGRESS NOTES
[facility]  [facility]  [address]  [facility] [id number] [phone]     Patient: Gabriele Brand   YOB: 1980   Date: 7/17/2025     Chief Complaint   Patient presents with    Left wrist        History of Present Illness  Gabriele is a right hand dominant 45 y.o. female who returns today for follow-up of left open carpal tunnel release on 7/2/2025 and right open carpal tunnel release on 6/18/2025.  Patient reports no significant interval medical issues.  Pain is controlled.  Reports interval improvement in preoperative symptoms.  Happy with their progress thus far.      Past Medical History:   Diagnosis Date    Arthritis     Hypertension     Labral tear of shoulder     fall from back porch      Past Surgical History:   Procedure Laterality Date    CAPSULORRHAPHY SHOULDER Right 6/26/2017    SHOULDER CAPSULORRHAPHY BURSECTOMY performed by Heriberto Cueto MD at [facility] OR    ENDOMETRIAL ABLATION      HYSTERECTOMY (CERVIX STATUS UNKNOWN)      SHOULDER ARTHROSCOPY Right 6/26/2017    RIGHT SHOULDER ARHTROSCOPIC BANKART REPAIR performed by Heriberto Cueto MD at [facility] OR      Social History     Socioeconomic History    Marital status:    Tobacco Use    Smoking status: Every Day     Current packs/day: 1.00     Average packs/day: 1 pack/day for 15.0 years (15.0 ttl pk-yrs)     Types: Cigarettes   Substance and Sexual Activity    Alcohol use: No    Drug use: No     Social Drivers of Health      Received from [facility]    Family and Community Support   Intimate Partner Violence: Unknown (7/1/2025)    Received from [facility]    Abuse Screen     Feels Unsafe at Home or Work/School: no     Feels Threatened by Someone: no     Does Anyone Try to Keep You From Having Contact with Others or Doing Things Outside Your Home?: no   Housing Stability: Unknown (6/11/2025)    Received from [facility]

## 2025-07-17 ENCOUNTER — OFFICE VISIT (OUTPATIENT)
Age: 45
End: 2025-07-17

## 2025-07-17 VITALS — BODY MASS INDEX: 32.05 KG/M2 | WEIGHT: 199.4 LBS | HEIGHT: 66 IN

## 2025-07-17 DIAGNOSIS — Z47.89 AFTERCARE FOLLOWING SURGERY OF THE MUSCULOSKELETAL SYSTEM: ICD-10-CM

## 2025-07-17 DIAGNOSIS — G56.01 CARPAL TUNNEL SYNDROME ON RIGHT: ICD-10-CM

## 2025-07-17 DIAGNOSIS — G56.02 CARPAL TUNNEL SYNDROME ON LEFT: Primary | ICD-10-CM

## 2025-08-29 ENCOUNTER — TELEPHONE (OUTPATIENT)
Age: 45
End: 2025-08-29

## 2025-09-02 ENCOUNTER — OFFICE VISIT (OUTPATIENT)
Age: 45
End: 2025-09-02

## 2025-09-02 VITALS — BODY MASS INDEX: 32.08 KG/M2 | HEIGHT: 66 IN | WEIGHT: 199.6 LBS

## 2025-09-02 DIAGNOSIS — M79.642 BILATERAL HAND PAIN: Primary | ICD-10-CM

## 2025-09-02 DIAGNOSIS — M79.641 BILATERAL HAND PAIN: Primary | ICD-10-CM

## 2025-09-02 PROCEDURE — 99024 POSTOP FOLLOW-UP VISIT: CPT | Performed by: NURSE PRACTITIONER

## (undated) DEVICE — 1010 S-DRAPE TOWEL DRAPE 10/BX: Brand: STERI-DRAPE™

## (undated) DEVICE — CONMED SCOPE SAVER BITE BLOCK, 20X27 MM: Brand: SCOPE SAVER

## (undated) DEVICE — DRESSING,GAUZE,XEROFORM,CURAD,5"X9",ST: Brand: CURAD

## (undated) DEVICE — STERILE POLYISOPRENE POWDER-FREE SURGICAL GLOVES: Brand: PROTEXIS

## (undated) DEVICE — SUTURE ETHLN SZ 3-0 L18IN NONABSORBABLE BLK FS-1 L24MM 3/8 663H

## (undated) DEVICE — 4.0 CM ACROMIOBLASTER STRAIGHT                                    BURRS, POWER/EP-1, SAGE GREEN, 8000                                    MAXIMUM RPM, PACKAGED 6 PER BOX, STERILE

## (undated) DEVICE — TBG SMPL FLTR LINE NASL 02/C02 A/ BX/100

## (undated) DEVICE — PATIENT RETURN ELECTRODE, SINGLE-USE, CONTACT QUALITY MONITORING, ADULT, WITH 9FT CORD, FOR PATIENTS WEIGING OVER 33LBS. (15KG): Brand: MEGADYNE

## (undated) DEVICE — FLUID CONTROL SHOULDER PACK: Brand: CONVERTORS

## (undated) DEVICE — SYR LL TP 10ML STRL

## (undated) DEVICE — Device: Brand: DEFENDO AIR/WATER/SUCTION AND BIOPSY VALVE

## (undated) DEVICE — GLOVE SURG SZ 75 L12IN FNGR THK94MIL TRNSLUC YEL LTX

## (undated) DEVICE — SUTURE PDS II SZ 1 L27IN ABSRB VLT CT L40MM 1/2 CIR TAPR Z353H

## (undated) DEVICE — 90-S MAX, SUCTION PROBE, NON-BENDABLE, MAX CUT LEVEL 11: Brand: SERFAS ENERGY

## (undated) DEVICE — INTENDED FOR TISSUE SEPARATION, AND OTHER PROCEDURES THAT REQUIRE A SHARP SURGICAL BLADE TO PUNCTURE OR CUT.: Brand: BARD-PARKER ® STAINLESS STEEL BLADES

## (undated) DEVICE — DISPOSABLE TOURNIQUET CUFF 24"X4", 1-LINE, YELLOW, STERILE, 1EA/PK, 10PK/CS: Brand: ASP MEDICAL

## (undated) DEVICE — 4-PORT MANIFOLD: Brand: NEPTUNE 2

## (undated) DEVICE — Z INACTIVE USE 2660664 SOLUTION IRRIG 3000ML 0.9% SOD CHL USP UROMATIC PLAS CONT

## (undated) DEVICE — THREE QUARTER SHEET: Brand: CONVERTORS

## (undated) DEVICE — SHOULDER CDS

## (undated) DEVICE — SUTURE PDS II SZ 1 L54IN ABSRB VLT L65MM TP-1 1/2 CIR Z879G

## (undated) DEVICE — CVR BRD ARM 13X30

## (undated) DEVICE — STRAP TRAC ARM TRAPS FOR SHLDR SUSP

## (undated) DEVICE — DISCONTINUED NO SUB IDED TG GLOVE SURG SENSICARE ALOE LT LF PF ST GRN SZ 8

## (undated) DEVICE — ENDOGATOR AUXILIARY WATER JET CONNECTOR: Brand: ENDOGATOR

## (undated) DEVICE — BNDG ELAS ECON W/CLIP 3IN 5YD LF STRL

## (undated) DEVICE — SENSR O2 OXIMAX FNGR A/ 18IN NONSTR

## (undated) DEVICE — GAUZE,SPONGE,4"X4",12PLY,STERILE,LF,2'S: Brand: MEDLINE

## (undated) DEVICE — SUT ETHLN 4/0 FS2 18IN 662H

## (undated) DEVICE — GLV SURG DERMASSURE GRN LF PF 8.0

## (undated) DEVICE — YANKAUER,BULB TIP WITH VENT: Brand: ARGYLE

## (undated) DEVICE — 3M™ COBAN™ NL STERILE NON-LATEX SELF-ADHERENT WRAP, 2086S, 6 IN X 5 YD (15 CM X 4,5 M), 12 ROLLS/CASE: Brand: 3M™ COBAN™

## (undated) DEVICE — ABDOMINAL PAD: Brand: DERMACEA

## (undated) DEVICE — CANNULA ARTHSCP L7CM ID575MM CRYS SMOOTH W OBT

## (undated) DEVICE — Device

## (undated) DEVICE — 4.5 MM INCISOR PLUS STRAIGHT                                    BLADES, POWER/EP-1, VIOLET, PACKAGED                                    6 PER BOX, STERILE

## (undated) DEVICE — MEDI-VAC NON-CONDUCTIVE SUCTION TUBING 6MM X 6.1M (20 FT.) L: Brand: CARDINAL HEALTH

## (undated) DEVICE — CABL BIPOL MEGADYNE 12FT DISP

## (undated) DEVICE — PK EXTRM 30

## (undated) DEVICE — CANNULA THREADED FLEX 8.0 X 72MM: Brand: CLEAR-TRAC

## (undated) DEVICE — T-MAX DISPOSABLE FACE MASK 8 PER BOX

## (undated) DEVICE — GLV SURG SENSICARE W/ALOE PF LF 7.5 STRL

## (undated) DEVICE — GLOVE SURG SZ 75 L12IN FNGR THK87MIL DK GRN LTX FREE ISOLEX

## (undated) DEVICE — SUTURE NONABSORBABLE 2 BLU NDL KHC 5 CUT POLYETH COBRAIDED 900284

## (undated) DEVICE — CUFF,BP,DISP,1 TUBE,ADULT,HP: Brand: MEDLINE

## (undated) DEVICE — THE CHANNEL CLEANING BRUSH IS A NYLON FLEXI BRUSH ATTACHED TO A FLEXIBLE PLASTIC SHEATH DESIGNED TO SAFELY REMOVE DEBRIS FROM FLEXIBLE ENDOSCOPES.

## (undated) DEVICE — 3M™ STERI-DRAPE™ U-DRAPE 1015: Brand: STERI-DRAPE™

## (undated) DEVICE — TUBING PMP IRRIG GOFLO

## (undated) DEVICE — FRCP BX RADJAW4 NDL 2.8 240 STD OG

## (undated) DEVICE — SOLUTION IV IRRIG POUR BRL 0.9% SODIUM CHL 2F7124